# Patient Record
Sex: MALE | Race: ASIAN | NOT HISPANIC OR LATINO | Employment: FULL TIME | URBAN - METROPOLITAN AREA
[De-identification: names, ages, dates, MRNs, and addresses within clinical notes are randomized per-mention and may not be internally consistent; named-entity substitution may affect disease eponyms.]

---

## 2017-08-29 ENCOUNTER — OFFICE VISIT (OUTPATIENT)
Dept: INTERNAL MEDICINE | Facility: MEDICAL CENTER | Age: 62
End: 2017-08-29
Payer: COMMERCIAL

## 2017-08-29 VITALS
HEIGHT: 64 IN | BODY MASS INDEX: 21.21 KG/M2 | DIASTOLIC BLOOD PRESSURE: 100 MMHG | OXYGEN SATURATION: 96 % | SYSTOLIC BLOOD PRESSURE: 150 MMHG | WEIGHT: 124.2 LBS | TEMPERATURE: 97.5 F | HEART RATE: 98 BPM

## 2017-08-29 DIAGNOSIS — R35.89 POLYURIA: ICD-10-CM

## 2017-08-29 DIAGNOSIS — R63.4 WEIGHT LOSS: ICD-10-CM

## 2017-08-29 DIAGNOSIS — R35.0 URINARY FREQUENCY: ICD-10-CM

## 2017-08-29 DIAGNOSIS — R31.29 MICROSCOPIC HEMATURIA: ICD-10-CM

## 2017-08-29 DIAGNOSIS — Z23 NEED FOR IMMUNIZATION AGAINST INFLUENZA: ICD-10-CM

## 2017-08-29 DIAGNOSIS — R81 GLUCOSURIA: ICD-10-CM

## 2017-08-29 DIAGNOSIS — R25.2 CRAMPS OF LEFT LOWER EXTREMITY: ICD-10-CM

## 2017-08-29 DIAGNOSIS — Z00.00 HEALTHCARE MAINTENANCE: ICD-10-CM

## 2017-08-29 DIAGNOSIS — E11.9 TYPE 2 DIABETES MELLITUS WITHOUT COMPLICATION, WITHOUT LONG-TERM CURRENT USE OF INSULIN (HCC): ICD-10-CM

## 2017-08-29 DIAGNOSIS — Z86.11 HISTORY OF TB (TUBERCULOSIS): ICD-10-CM

## 2017-08-29 DIAGNOSIS — I10 ESSENTIAL HYPERTENSION: ICD-10-CM

## 2017-08-29 DIAGNOSIS — Z23 NEED FOR VACCINATION: ICD-10-CM

## 2017-08-29 LAB
APPEARANCE UR: CLEAR
BILIRUB UR STRIP-MCNC: ABNORMAL MG/DL
COLOR UR AUTO: YELLOW
GLUCOSE UR STRIP.AUTO-MCNC: 2000 MG/DL
HBA1C MFR BLD: 14.5 % (ref ?–5.8)
INT CON NEG: NEGATIVE
INT CON POS: POSITIVE
KETONES UR STRIP.AUTO-MCNC: 40 MG/DL
LEUKOCYTE ESTERASE UR QL STRIP.AUTO: ABNORMAL
NITRITE UR QL STRIP.AUTO: ABNORMAL
PH UR STRIP.AUTO: 5 [PH] (ref 5–8)
PROT UR QL STRIP: 100 MG/DL
RBC UR QL AUTO: ABNORMAL
SP GR UR STRIP.AUTO: 1.01
UROBILINOGEN UR STRIP-MCNC: ABNORMAL MG/DL

## 2017-08-29 PROCEDURE — 90686 IIV4 VACC NO PRSV 0.5 ML IM: CPT | Performed by: INTERNAL MEDICINE

## 2017-08-29 PROCEDURE — 81002 URINALYSIS NONAUTO W/O SCOPE: CPT | Performed by: INTERNAL MEDICINE

## 2017-08-29 PROCEDURE — 99205 OFFICE O/P NEW HI 60 MIN: CPT | Mod: GC,25 | Performed by: INTERNAL MEDICINE

## 2017-08-29 PROCEDURE — 83036 HEMOGLOBIN GLYCOSYLATED A1C: CPT | Performed by: INTERNAL MEDICINE

## 2017-08-29 PROCEDURE — 90471 IMMUNIZATION ADMIN: CPT | Performed by: INTERNAL MEDICINE

## 2017-08-29 RX ORDER — INSULIN GLARGINE 100 [IU]/ML
15 INJECTION, SOLUTION SUBCUTANEOUS EVERY EVENING
Qty: 100 ML | Refills: 6 | Status: SHIPPED | OUTPATIENT
Start: 2017-08-29 | End: 2018-03-05 | Stop reason: SDUPTHER

## 2017-08-29 RX ORDER — LISINOPRIL 20 MG/1
20 TABLET ORAL DAILY
Qty: 30 TAB | Refills: 3 | Status: SHIPPED | OUTPATIENT
Start: 2017-08-29 | End: 2018-01-09 | Stop reason: SDUPTHER

## 2017-08-29 RX ORDER — LANCETS 30 GAUGE
EACH MISCELLANEOUS
Qty: 300 EACH | Refills: 3 | Status: SHIPPED | OUTPATIENT
Start: 2017-08-29 | End: 2018-03-05 | Stop reason: SDUPTHER

## 2017-08-29 RX ORDER — ATORVASTATIN CALCIUM 20 MG/1
20 TABLET, FILM COATED ORAL DAILY
Qty: 90 TAB | Refills: 3 | Status: SHIPPED | OUTPATIENT
Start: 2017-08-29 | End: 2018-08-20 | Stop reason: SDUPTHER

## 2017-08-29 ASSESSMENT — ENCOUNTER SYMPTOMS
SPUTUM PRODUCTION: 0
BLOOD IN STOOL: 0
SORE THROAT: 0
HEMOPTYSIS: 0
VOMITING: 0
BLURRED VISION: 0
ABDOMINAL PAIN: 0
CLAUDICATION: 0
HEADACHES: 0
COUGH: 0
EYE PAIN: 0
PALPITATIONS: 0
PSYCHIATRIC NEGATIVE: 1
WEIGHT LOSS: 1
SHORTNESS OF BREATH: 0
NAUSEA: 0
EYE REDNESS: 0
NEUROLOGICAL NEGATIVE: 1
HEARTBURN: 0
FEVER: 0
CHILLS: 0

## 2017-08-29 ASSESSMENT — PATIENT HEALTH QUESTIONNAIRE - PHQ9: CLINICAL INTERPRETATION OF PHQ2 SCORE: 0

## 2017-08-29 NOTE — PATIENT INSTRUCTIONS
Record your blood pressure daily and bring back your BP log  Bring BG log to the appointment  Follow up in 2 wks with Dr. Yarelis Benito now.   Attend diabetic education class.    Diabetic Diet.  1800 Calorie Diet for Diabetes Meal Planning  The 1800 calorie diet is designed for eating up to 1800 calories each day. Following this diet and making healthy meal choices can help improve overall health. This diet controls blood sugar (glucose) levels and can also help lower blood pressure and cholesterol.  SERVING SIZES  Measuring foods and serving sizes helps to make sure you are getting the right amount of food. The list below tells how big or small some common serving sizes are:  · 1 oz.........4 stacked dice.   · 3 oz.........Deck of cards.   · 1 tsp........Tip of little finger.   · 1 tbs........Thumb.   · 2 tbs........Golf ball.   · ½ cup.......Half of a fist.   · 1 cup........A fist.   GUIDELINES FOR CHOOSING FOODS  The goal of this diet is to eat a variety of foods and limit calories to 1800 each day. This can be done by choosing foods that are low in calories and fat. The diet also suggests eating small amounts of food frequently. Doing this helps control your blood glucose levels so they do not get too high or too low. Each meal or snack may include a protein food source to help you feel more satisfied and to stabilize your blood glucose. Try to eat about the same amount of food around the same time each day. This includes weekend days, travel days, and days off work. Space your meals about 4 to 5 hours apart and add a snack between them if you wish.   For example, a daily food plan could include breakfast, a morning snack, lunch, dinner, and an evening snack. Healthy meals and snacks include whole grains, vegetables, fruits, lean meats, poultry, fish, and dairy products. As you plan your meals, select a variety of foods. Choose from the bread and starch, vegetable, fruit, dairy, and meat/protein groups. Examples  of foods from each group and their suggested serving sizes are listed below. Use measuring cups and spoons to become familiar with what a healthy portion looks like.  Bread and Starch  Each serving equals 15 grams of carbohydrates.  · 1 slice bread.   · ¼ bagel.   · ¾ cup cold cereal (unsweetened).   · ½ cup hot cereal or mashed potatoes.   · 1 small potato (size of a computer mouse).   ·  cup cooked pasta or rice.   · ½ English muffin.   · 1 cup broth-based soup.   · 3 cups of popcorn.   · 4 to 6 whole-wheat crackers.   · ½ cup cooked beans, peas, or corn.   Vegetable  Each serving equals 5 grams of carbohydrates.  · ½ cup cooked vegetables.   · 1 cup raw vegetables.   · ½ cup tomato or vegetable juice.   Fruit  Each serving equals 15 grams of carbohydrates.  · 1 small apple or orange.   · 1¼ cup watermelon or strawberries.   · ½ cup applesauce (no sugar added).   · 2 tbs raisins.   · ½ banana.   · ½ cup canned fruit, packed in water, its own juice, or sweetened with a sugar substitute.   · ½ cup unsweetened fruit juice.   Dairy  Each serving equals 12 to 15 grams of carbohydrates.  · 1 cup fat-free milk.   · 6 oz artificially sweetened yogurt or plain yogurt.   · 1 cup low-fat buttermilk.   · 1 cup soy milk.   · 1 cup almond milk.   Meat/Protein  · 1 large egg.   · 2 to 3 oz meat, poultry, or fish.   · ¼ cup low-fat cottage cheese.   · 1 tbs peanut butter.   · 1 oz low-fat cheese.   · ¼ cup tuna in water.   · ½ cup tofu.   Fat  · 1 tsp oil.   · 1 tsp trans-fat-free margarine.   · 1 tsp butter.   · 1 tsp mayonnaise.   · 2 tbs avocado.   · 1 tbs salad dressing.   · 1 tbs cream cheese.   · 2 tbs sour cream.   SAMPLE 1800 CALORIE DIET PLAN  Breakfast  · ¾ cup unsweetened cereal (1 carb serving).   · 1 cup fat-free milk (1 carb serving).   · 1 slice whole-wheat toast (1 carb serving).   · ½ small banana (1 carb serving).   · 1 scrambled egg.   · 1 tsp trans-fat-free margarine.   Lunch  · Tuna sandwich.   · 2 slices  whole-wheat bread (2 carb servings).   · ½ cup canned tuna in water, drained.   · 1 tbs reduced fat mayonnaise.   · 1 stalk celery, chopped.   · 2 slices tomato.   · 1 lettuce leaf.   · 1 cup carrot sticks.   · 24 to 30 seedless grapes (2 carb servings).   · 6 oz light yogurt (1 carb serving).   Afternoon Snack  · 3 vanessa cracker squares (1 carb serving).   · Fat-free milk, 1 cup (1 carb serving).   · 1 tbs peanut butter.   Dinner  · 3 oz salmon, broiled with 1 tsp oil.   · 1 cup mashed potatoes (2 carb servings) with 1 tsp trans-fat-free margarine.   · 1 cup fresh or frozen green beans.   · 1 cup steamed asparagus.   · 1 cup fat-free milk (1 carb serving).   Evening Snack  · 3 cups air-popped popcorn (1 carb serving).   · 2 tbs parmesan cheese sprinkled on top.   MEAL PLAN  Use this worksheet to help you make a daily meal plan based on the 1800 calorie diet suggestions. If you are using this plan to help you control your blood glucose, you may interchange carbohydrate-containing foods (dairy, starches, and fruits). Select a variety of fresh foods of varying colors and flavors. The total amount of carbohydrate in your meals or snacks is more important than making sure you include all of the food groups every time you eat. Choose from the following foods to build your day's meals:  · 8 Starches.   · 4 Vegetables.   · 3 Fruits.   · 2 Dairy.   · 6 to 7 oz Meat/Protein.   · Up to 4 Fats.   Your dietician can use this worksheet to help you decide how many servings and which types of foods are right for you.  BREAKFAST  Food Group and Servings / Food Choice  Starch ________________________________________________________  Dairy _________________________________________________________  Fruit _________________________________________________________  Meat/Protein __________________________________________________  Fat ___________________________________________________________  LUNCH  Food Group and Servings / Food  Choice  Starch ________________________________________________________  Meat/Protein __________________________________________________  Vegetable _____________________________________________________  Fruit _________________________________________________________  Dairy _________________________________________________________  Fat ___________________________________________________________  AFTERNOON SNACK  Food Group and Servings / Food Choice  Starch ________________________________________________________  Meat/Protein __________________________________________________  Fruit __________________________________________________________  Dairy _________________________________________________________  DINNER  Food Group and Servings / Food Choice  Starch _________________________________________________________  Meat/Protein ___________________________________________________  Dairy __________________________________________________________  Vegetable ______________________________________________________  Fruit ___________________________________________________________  Fat ____________________________________________________________  EVENING SNACK  Food Group and Servings / Food Choice  Fruit __________________________________________________________  Meat/Protein ___________________________________________________  Dairy __________________________________________________________  Starch _________________________________________________________  DAILY TOTALS  Starch ____________________________  Vegetable _________________________  Fruit _____________________________  Dairy _____________________________  Meat/Protein______________________  Fat _______________________________  Document Released: 07/10/2006 Document Revised: 03/11/2013 Document Reviewed: 11/02/2012  [employer]® Patient Information ©2013 [employer].

## 2017-08-29 NOTE — PROGRESS NOTES
Established Patient    Nabeel presents today with the following:    CC:  Susan.       HPI:     62 y/o M with a h/o Latent TB (completed full treatment), HTN presented here to establish.  He doesn't see PCP regularly and no other known medical history.  He stopped taking his antihypertensive medications about 1 yr ago.  Today, his /100.    As for his TB history.  He was diagnosed with latent TB after coming from [facility].  He was treated at [facility].  There was a concern kamryn completed the full treatment as patient reported that he didn't follow up with [facility].  Contacted [facility] and record obtained.  He completed the full treatment for TB and was cleared by [facility].  His full family was tested and all tested negative for TB at that time.   He didn't travel outside since that time.  He denies fever, chills, night sweats or cough.  He has has 30lb weight loss over 1 yr period and he attributes this to working hard.  On ROS, he has urinary frequency, polyuria and polydipsia.  In office UA showed glucose 2000, ketones 40, trace blood, and protein 100.  Negative for LE, and WBC.  Subsequent A1C showed 14.5.  Weight loss is likely due to DM.     HCM-  Never had colonoscopy              Patient Active Problem List    Diagnosis Date Noted   • Jaundice 01/26/2015       Current Outpatient Prescriptions   Medication Sig Dispense Refill   • lisinopril (PRINIVIL) 20 MG Tab Take 1 Tab by mouth every day. 30 Tab 3   • metformin (GLUCOPHAGE) 1000 MG tablet Take 1 Tab by mouth 2 times a day, with meals. 180 Tab 3   • insulin glargine (LANTUS) 100 UNIT/ML Solution Inject 15 Units as instructed every evening. 100 mL 6   • Blood Glucose Monitoring Suppl Supplies Misc Test strips order: Test strips for One Touch Ultra 2 meter. Sig: use  TID and prn ssx high or low sugar #100 RF x 3 300 Each 3   • Lancets Misc Lancets order: Lancets for One Touch Ultra 2 meter. Sig: use TID  "and prn ssx high or low sugar. #100 RF x 3 300 Each 3   • Blood Glucose Monitoring Suppl Device Meter: Dispense One Touch Ultra 2 meter. Sig. Use as directed for blood sugar monitoring. #1. NR. 1 Device 0   • atorvastatin (LIPITOR) 20 MG Tab Take 1 Tab by mouth every day. 90 Tab 3     No current facility-administered medications for this visit.        ROS: As per HPI. Additional pertinent symptoms as noted below.      Review of Systems   Constitutional: Positive for weight loss. Negative for chills and fever.   HENT: Negative for sore throat.    Eyes: Negative for blurred vision, pain and redness.   Respiratory: Negative for cough, hemoptysis, sputum production and shortness of breath.    Cardiovascular: Negative for chest pain, palpitations, claudication and leg swelling.   Gastrointestinal: Negative for abdominal pain, blood in stool, heartburn, melena, nausea and vomiting.   Genitourinary: Positive for frequency. Negative for dysuria, hematuria and urgency.        Denies weak stream or difficulty initiating urination   Musculoskeletal:        Muscle cramp   Skin: Negative for itching and rash.   Neurological: Negative.  Negative for headaches.   Endo/Heme/Allergies: Negative.    Psychiatric/Behavioral: Negative.        /100   Pulse 98   Temp 36.4 °C (97.5 °F)   Ht 1.632 m (5' 4.25\")   Wt 56.3 kg (124 lb 3.2 oz)   SpO2 96%   BMI 21.15 kg/m²     Physical Exam   Constitutional:  oriented to person, place, and time. Thin  Eyes: Pupils are equal, round.  No scleral icterus.  Neck: Neck supple. No thyromegaly present.   Cardiovascular: Normal rate, regular rhythm and normal heart sounds.  Exam reveals no gallop and no friction rub.  No murmur heard.  Pulmonary/Chest: Breath sounds normal. Chest wall is not dull to percussion.   Musculoskeletal:   no edema.   Abdomen:  Soft, BS+, Non tender, non distended  Lymphadenopathy: no cervical adenopathy  Neurological: alert and oriented to person, place, and time.   "   Skin: No cyanosis. Nails show no clubbing.        Assessment and Plan    1. Essential hypertension  - Will start Lisinopril 20mg   - Patient to monitor BP at home and bring BP log to next appointment  - Titrate BP medications   - lisinopril (PRINIVIL) 20 MG Tab; Take 1 Tab by mouth every day.  Dispense: 30 Tab; Refill: 3    2. Weight loss  - Likely related to undiagnosed diabetes  - No lymphadenopathy.   Stopped smoking 25 yrs ago.   - Discussed with routine cancer screening   - Patient states he will think about Colonoscopy.   - Given his new diagnoses of DM, new medications and referrals, he is quite overwhelmed at this time  - Will see him in 2 wks and discuss about colonoscopy again    3. History of TB (tuberculosis)  - Completed treatment.  Confirmed with [facility]  - No symptoms    4. Healthcare maintenance  - Will get routine labs   - CBC WITH DIFFERENTIAL; Future  - COMP METABOLIC PANEL; Future  - TSH WITH REFLEX TO FT4; Future  - LIPID PROFILE; Future    5. Urinary frequency      Polyuria  - No sign of BPH such as difficulty urinating and weak stream  - Urinary frequency/Polydipsia due to DM   - POCT Urinalysis    6. Glucosuria  - Due to Diabetes  - A1C 14.5  - POCT  A1C    7. Microscopic hematuria  - UA: Trace blood  - Will get PSA  - PROSTATE SPECIFIC AG    8. Need for immunization against influenza  - Flu shot given  - Flu Quad Inj >3 Year Pre-Filled (Preservative Free)    9. Type 2 diabetes mellitus without complication, without long-term current use of insulin (CMS-[facility])  - Diagnosed today.   Hasn't seen doctor for sometime  - Patient would benefit from Metformin as well as long acting insulin  - Patient declined to use Insulin due to concern that it may interfere with his work  - Reassured it is only once day dosing and it can be morning or evening   - Given his new diagnoses of DM, new medications and referrals, he is quite overwhelmed at this time  - Rx for Lantus 15 u qday is sent to his  pharmacy but will give him some time to digest this  - Patient continued to decline to use Insulin.   I informed him he needs Insulin given his A1C.   - Will see him in 2 wks and start lantus 15 u at that time.  Patient agreed  - Meantime, he will take Metformin 1000 BID  - Routine DM screening ordered and referral to DM educations as well as Ophthalmologist placed    - metformin (GLUCOPHAGE) 1000 MG tablet; Take 1 Tab by mouth 2 times a day, with meals.  Dispense: 180 Tab; Refill: 3  - insulin glargine (LANTUS) 100 UNIT/ML Solution; Inject 15 Units as instructed every evening.  Dispense: 100 mL; Refill: 6  - REFERRAL TO DIABETIC EDUCATION Diabetes Self Management Education / Training (DSME/T) and Medical Nutrition Therapy (MNT): Initial Group DSME/MNT as authorized by payor, Follow-Up DSME/MNT as authorized by payor; DSME/T Content: Monitoring Diabete...  - Blood Glucose Monitoring Suppl Supplies Misc; Test strips order: Test strips for One Touch Ultra 2 meter. Sig: use  TID and prn ssx high or low sugar #100 RF x 3  Dispense: 300 Each; Refill: 3  - Lancets Misc; Lancets order: Lancets for One Touch Ultra 2 meter. Sig: use TID and prn ssx high or low sugar. #100 RF x 3  Dispense: 300 Each; Refill: 3  - Blood Glucose Monitoring Suppl Device; Meter: Dispense One Touch Ultra 2 meter. Sig. Use as directed for blood sugar monitoring. #1. NR.  Dispense: 1 Device; Refill: 0  - MICROALBUMIN CREAT RATIO URINE; Future  - atorvastatin (LIPITOR) 20 MG Tab; Take 1 Tab by mouth every day.  Dispense: 90 Tab; Refill: 3  - REFERRAL TO OPHTHALMOLOGY    10. Leg Cramp  - Will check electrolytes  - Patient was advised warm compression for cramp  - CTM    Followup: Return in about 2 weeks (around 9/12/2017).      Signed by: Weston Valdivia M.D.

## 2017-08-29 NOTE — LETTER
[facility]  Weston Valdivia M.D.  [address]  [place] [address]  Fax: [phone]   Authorization for Release/Disclosure of   Protected Health Information   Name: TREY AIKEN JR. : 1955 SSN: [id number]   Address:  [facility] [facility] [address]  [place] [address] Phone:    [phone] (home)    I authorize the entity listed below to release/disclose the PHI below to:   [facility]/Weston Valdivia M.D. and Weston Valdivia M.D.   Provider or Entity Name:  [facility] Dept    Address   City, [place], [facility]   Phone:  [phone]    Fax:  [phone]   Reason for request: continuity of care   Information to be released: All TB related info-pt is here in our office    [  ] LAST COLONOSCOPY,  including any PATH REPORT and follow-up  [  ] LAST FIT/COLOGUARD RESULT [  ] LAST DEXA  [  ] LAST MAMMOGRAM  [  ] LAST PAP  [  ] LAST LABS [  ] RETINA EXAM REPORT  [  ] IMMUNIZATION RECORDS  [  ] Release all info      [  ] Check here and initial the line next to each item to release ALL health information INCLUDING  _____ Care and treatment for drug and / or alcohol abuse  _____ HIV testing, infection status, or AIDS  _____ Genetic Testing    DATES OF SERVICE OR TIME PERIOD TO BE DISCLOSED: _____________  I understand and acknowledge that:  * This Authorization may be revoked at any time by you in writing, except if your health information has already been used or disclosed.  * Your health information that will be used or disclosed as a result of you signing this authorization could be re-disclosed by the recipient. If this occurs, your re-disclosed health information may no longer be protected by State or Federal laws.  * You may refuse to sign this Authorization. Your refusal will not affect your ability to obtain treatment.  * This Authorization becomes effective upon signing and will  on (date) __________.      If no date is indicated, this Authorization will  one (1) year from the  signature date.    Name: Nabeel Lopez JrRoland    Signature:   Date:     8/29/2017       PLEASE FAX REQUESTED RECORDS BACK TO: [phone]  [facility]

## 2017-08-30 ENCOUNTER — TELEPHONE (OUTPATIENT)
Dept: INTERNAL MEDICINE | Facility: MEDICAL CENTER | Age: 62
End: 2017-08-30

## 2017-08-30 NOTE — TELEPHONE ENCOUNTER
1. Caller Name: Ana (daughter)                       Call Back Number: [phone]    2. Message: Will it be ok for Nabeel to have a dental extraction?     3. Patient approves office to leave a detailed voicemail/MyChart message: yes

## 2017-08-30 NOTE — TELEPHONE ENCOUNTER
Will await for lab result. If labs are unremarkable, he can proceed with dental extraction.   Called patient and left a voicemail.

## 2017-09-01 ENCOUNTER — TELEPHONE (OUTPATIENT)
Dept: INTERNAL MEDICINE | Facility: MEDICAL CENTER | Age: 62
End: 2017-09-01

## 2017-09-01 NOTE — TELEPHONE ENCOUNTER
1. Caller Name: Ana (daughter)                       Call Back Number: [phone]    2. Message: 1. One of the new rx's is causing insomnia.     2. Is Advil ok to take for HA's?     3. Patient approves office to leave a detailed voicemail/MyChart message: yes

## 2017-09-06 LAB
ALBUMIN SERPL-MCNC: 3.7 G/DL (ref 3.6–4.8)
ALBUMIN/CREAT UR: 106.4 MG/G CREAT (ref 0–30)
ALBUMIN/GLOB SERPL: 0.9 {RATIO} (ref 1.2–2.2)
ALP SERPL-CCNC: 224 IU/L (ref 39–117)
ALT SERPL-CCNC: 28 IU/L (ref 0–44)
AST SERPL-CCNC: 27 IU/L (ref 0–40)
BASOPHILS # BLD AUTO: 0 X10E3/UL (ref 0–0.2)
BASOPHILS NFR BLD AUTO: 0 %
BILIRUB SERPL-MCNC: 1.3 MG/DL (ref 0–1.2)
BUN SERPL-MCNC: 15 MG/DL (ref 8–27)
BUN/CREAT SERPL: 18 (ref 10–24)
CALCIUM SERPL-MCNC: 9.3 MG/DL (ref 8.6–10.2)
CHLORIDE SERPL-SCNC: 95 MMOL/L (ref 96–106)
CHOLEST SERPL-MCNC: 115 MG/DL (ref 100–199)
CO2 SERPL-SCNC: 24 MMOL/L (ref 18–29)
COMMENT 011824: NORMAL
CREAT SERPL-MCNC: 0.85 MG/DL (ref 0.76–1.27)
CREAT UR-MCNC: 65.8 MG/DL
EOSINOPHIL # BLD AUTO: 0.1 X10E3/UL (ref 0–0.4)
EOSINOPHIL NFR BLD AUTO: 1 %
ERYTHROCYTE [DISTWIDTH] IN BLOOD BY AUTOMATED COUNT: 13.4 % (ref 12.3–15.4)
GLOBULIN SER CALC-MCNC: 4 G/DL (ref 1.5–4.5)
GLUCOSE SERPL-MCNC: 270 MG/DL (ref 65–99)
HCT VFR BLD AUTO: 47.8 % (ref 37.5–51)
HDLC SERPL-MCNC: 75 MG/DL
HGB BLD-MCNC: 16.4 G/DL (ref 12.6–17.7)
IMM GRANULOCYTES # BLD: 0 X10E3/UL (ref 0–0.1)
IMM GRANULOCYTES NFR BLD: 0 %
IMMATURE CELLS  115398: ABNORMAL
LDLC SERPL CALC-MCNC: 24 MG/DL (ref 0–99)
LYMPHOCYTES # BLD AUTO: 3.2 X10E3/UL (ref 0.7–3.1)
LYMPHOCYTES NFR BLD AUTO: 41 %
MCH RBC QN AUTO: 33.2 PG (ref 26.6–33)
MCHC RBC AUTO-ENTMCNC: 34.3 G/DL (ref 31.5–35.7)
MCV RBC AUTO: 97 FL (ref 79–97)
MICROALBUMIN UR-MCNC: 70 UG/ML
MONOCYTES # BLD AUTO: 0.5 X10E3/UL (ref 0.1–0.9)
MONOCYTES NFR BLD AUTO: 6 %
MORPHOLOGY BLD-IMP: ABNORMAL
NEUTROPHILS # BLD AUTO: 4 X10E3/UL (ref 1.4–7)
NEUTROPHILS NFR BLD AUTO: 52 %
NRBC BLD AUTO-RTO: ABNORMAL %
PLATELET # BLD AUTO: 203 X10E3/UL (ref 150–379)
POTASSIUM SERPL-SCNC: 4.6 MMOL/L (ref 3.5–5.2)
PROT SERPL-MCNC: 7.7 G/DL (ref 6–8.5)
RBC # BLD AUTO: 4.94 X10E6/UL (ref 4.14–5.8)
SODIUM SERPL-SCNC: 135 MMOL/L (ref 134–144)
TRIGL SERPL-MCNC: 78 MG/DL (ref 0–149)
TSH SERPL DL<=0.005 MIU/L-ACNC: 1.28 UIU/ML (ref 0.45–4.5)
VLDLC SERPL CALC-MCNC: 16 MG/DL (ref 5–40)
WBC # BLD AUTO: 7.8 X10E3/UL (ref 3.4–10.8)

## 2017-09-06 NOTE — TELEPHONE ENCOUNTER
He may take advil for headache.  If he still has insomnia, please ask patient to come to office and get evaluated.  MA please call patient.

## 2017-09-14 ENCOUNTER — OFFICE VISIT (OUTPATIENT)
Dept: INTERNAL MEDICINE | Facility: MEDICAL CENTER | Age: 62
End: 2017-09-14
Payer: COMMERCIAL

## 2017-09-14 VITALS
HEIGHT: 65 IN | OXYGEN SATURATION: 96 % | TEMPERATURE: 97.1 F | SYSTOLIC BLOOD PRESSURE: 106 MMHG | WEIGHT: 127.25 LBS | DIASTOLIC BLOOD PRESSURE: 80 MMHG | HEART RATE: 90 BPM | BODY MASS INDEX: 21.2 KG/M2

## 2017-09-14 DIAGNOSIS — R74.8 ELEVATED ALKALINE PHOSPHATASE LEVEL: ICD-10-CM

## 2017-09-14 DIAGNOSIS — E11.9 TYPE 2 DIABETES MELLITUS WITHOUT COMPLICATION, WITHOUT LONG-TERM CURRENT USE OF INSULIN (HCC): ICD-10-CM

## 2017-09-14 DIAGNOSIS — I10 ESSENTIAL HYPERTENSION: ICD-10-CM

## 2017-09-14 DIAGNOSIS — R35.0 URINARY FREQUENCY: ICD-10-CM

## 2017-09-14 DIAGNOSIS — H26.9 CATARACT OF BOTH EYES, UNSPECIFIED CATARACT TYPE: ICD-10-CM

## 2017-09-14 PROCEDURE — 99214 OFFICE O/P EST MOD 30 MIN: CPT | Mod: GC | Performed by: INTERNAL MEDICINE

## 2017-09-15 NOTE — PROGRESS NOTES
Established Patient    Nabeel presents today with the following:    CC: 2-week follow up    HPI: Mr. Lopez is a 61 year old gentleman with PMHx significant for HTN and DM2 with polyuria who presents for 2-week follow up after starting lisinopril and metformin and after obtaining labs.  Today, patient doing well overall except for increased blurry vision over past 2 months.  Reviewed lab results with patient, which showed elevated alkaline phosphatase.      HTN: Patient taking lisinopril with no problems.  Has kept a BP log, which has shown SBPs from 100s-120s and DBPs from 70s-80s.  Denies dizziness, lightheadedness, nausea, cough.      DM2, polyuria: Patient taking metformin, but unable to check blood sugar because he could not afford the blood glucose monitor and strips.  Denies tremors/shakes, diaphoresis.  Patient states urinary frequency has improved, going from 6x at night to twice a night.     Blurry vision: Patient reports worsening blurry vision x2 months.  States he had an Ophthalmology appointment today (Dr. Busby) during which he was told he has cataracts and needs cataracts surgery.  Patient states he needs time to consider this.      Elevated alkaline phosphatase: Patient denies abdominal pain, pruritus, myalgias/bone pain.        Patient Active Problem List    Diagnosis Date Noted   • Essential hypertension 08/29/2017   • Weight loss 08/29/2017   • History of TB (tuberculosis) 08/29/2017   • Urinary frequency 08/29/2017   • Glucosuria 08/29/2017   • Polyuria 08/29/2017   • Microscopic hematuria 08/29/2017   • Need for immunization against influenza 08/29/2017   • Type 2 diabetes mellitus without complication, without long-term current use of insulin (CMS-[facility]) 08/29/2017       Current Outpatient Prescriptions   Medication Sig Dispense Refill   • lisinopril (PRINIVIL) 20 MG Tab Take 1 Tab by mouth every day. 30 Tab 3   • metformin (GLUCOPHAGE) 1000 MG tablet Take 1 Tab by mouth 2 times a day, with  "meals. 180 Tab 3   • atorvastatin (LIPITOR) 20 MG Tab Take 1 Tab by mouth every day. 90 Tab 3   • insulin glargine (LANTUS) 100 UNIT/ML Solution Inject 15 Units as instructed every evening. 100 mL 6   • Blood Glucose Monitoring Suppl Supplies Misc Test strips order: Test strips for One Touch Ultra 2 meter. Sig: use  TID and prn ssx high or low sugar #100 RF x 3 300 Each 3   • Lancets Misc Lancets order: Lancets for One Touch Ultra 2 meter. Sig: use TID and prn ssx high or low sugar. #100 RF x 3 300 Each 3   • Blood Glucose Monitoring Suppl Device Meter: Dispense One Touch Ultra 2 meter. Sig. Use as directed for blood sugar monitoring. #1. NR. 1 Device 0     No current facility-administered medications for this visit.        ROS: 12 point review of systems negative except as reported in HPI and below:     Eyes:  Positive worsening vision x2 months secondary to cataracts.    :  Positive for improving polyuria.        /80   Pulse 90   Temp 36.2 °C (97.1 °F)   Ht 1.638 m (5' 4.5\")   Wt 57.7 kg (127 lb 4 oz)   SpO2 96%   BMI 21.51 kg/m²     Physical Exam   Constitutional:  Sitting in chair in exam room.  Oriented to person, place, time, and situation.  In no apparent distress.    Eyes: Bilateral cataracts present.    Neck: Neck supple. No thyromegaly present.   Cardiovascular: Normal rate, regular rhythm and normal heart sounds.  Exam reveals no gallop and no friction rub.  No murmur heard.  Pulmonary/Chest: Breath sounds normal. Chest wall is not dull to percussion.   Musculoskeletal:   no edema.   Lymphadenopathy: no cervical adenopathy  Skin: No cyanosis.  No rash.      Monofilament test: negative.      Note: I have reviewed all pertinent labs and diagnostic tests associated with this visit with specific comments listed under the assessment and plan below.       Assessment and Plan    1. Essential hypertension  - Patient started on lisinopril 20 mg daily two weeks ago.  No reported side effects/problems. "    - Patient's home BP log: SBPs from 100s-120s and DBPs from 70s-80s.    - Continue lisinopril.  Will continue to monitor.     2. Type 2 diabetes mellitus without complication, without long-term current use of insulin (CMS-[facility])  3. Polyuria  - Patient taking metformin 1000 mg twice daily with meals.  No reported side effects/problems.  Urinary frequency has significantly improved.    - Has not been checking blood sugar because he could not afford the blood glucose monitor and strips.  Patient given print out of [employer] (his health insurance provider)-preferred blood glucose monitor and strips, which are 100% covered.  He will show this to his daughter, who assists with his healthcare, and they will order a monitor and strips.    - Monofilament test negative.   - Patient to start taking Lantus 15 U daily.  Was given a demonstration of how to use insulin pen.  Was also provided a handout on how to use insulin pen.    - Had appointment with Ophthalmology today (see problem #5).      4. Elevated alkaline phosphatase level  - 9/5/17 Alk phos 224.  Total bili 1.3.  AST/ALT unremarkable.    - Alk phos has been elevated to 110-140s prior to this.    - Patient denies abdominal pain, pruritus, bone pain, myalgias.  - No jaundice, scleral icterus.    - Will check GGT.      5. Cataract of both eyes, unspecified cataract type  - Patient reports worsening blurry vision x2 months.  Had Ophthalmology appointment today (Dr. Busby).  Patient reports cataracts surgery was recommended.    - Patient to follow up with Ophthalmology.      6. Health maintenance  - Patient has never had a colonoscopy.  Will reassess his willingness to undergo one on follow up visit.    - Flu vaccine: Patient states he will obtain vaccine at the pharmacy.        Followup: Return in about 3 months (around 12/14/2017).    Signed by: Aydee Santoyo M.D.

## 2017-09-19 DIAGNOSIS — E11.9 TYPE 2 DIABETES MELLITUS WITHOUT COMPLICATION, WITHOUT LONG-TERM CURRENT USE OF INSULIN (HCC): ICD-10-CM

## 2017-09-20 LAB — GGT SERPL-CCNC: 583 IU/L (ref 0–65)

## 2017-12-28 ENCOUNTER — OFFICE VISIT (OUTPATIENT)
Dept: INTERNAL MEDICINE | Facility: MEDICAL CENTER | Age: 62
End: 2017-12-28
Payer: COMMERCIAL

## 2017-12-28 VITALS
OXYGEN SATURATION: 94 % | SYSTOLIC BLOOD PRESSURE: 141 MMHG | BODY MASS INDEX: 24.12 KG/M2 | DIASTOLIC BLOOD PRESSURE: 90 MMHG | HEART RATE: 74 BPM | TEMPERATURE: 97.5 F | HEIGHT: 65 IN | WEIGHT: 144.8 LBS

## 2017-12-28 DIAGNOSIS — I10 ESSENTIAL HYPERTENSION: ICD-10-CM

## 2017-12-28 DIAGNOSIS — E11.9 TYPE 2 DIABETES MELLITUS WITHOUT COMPLICATION, WITHOUT LONG-TERM CURRENT USE OF INSULIN (HCC): ICD-10-CM

## 2017-12-28 DIAGNOSIS — R74.8 ELEVATED SERUM GGT LEVEL: ICD-10-CM

## 2017-12-28 DIAGNOSIS — R74.8 ELEVATED SERUM ALKALINE PHOSPHATASE LEVEL: ICD-10-CM

## 2017-12-28 DIAGNOSIS — Z00.00 HEALTHCARE MAINTENANCE: ICD-10-CM

## 2017-12-28 LAB — GLUCOSE BLD-MCNC: 196 MG/DL (ref 70–100)

## 2017-12-28 PROCEDURE — 99214 OFFICE O/P EST MOD 30 MIN: CPT | Mod: GC | Performed by: INTERNAL MEDICINE

## 2017-12-28 PROCEDURE — 82962 GLUCOSE BLOOD TEST: CPT | Performed by: INTERNAL MEDICINE

## 2017-12-29 NOTE — PROGRESS NOTES
Established Patient    Nabeel presents today with the following:    CC: 3 month follow up.      HPI: Mr. Lopez is a 62 year old gentleman with PMHx significant for HTN, DM2, cataracts, and elevated alkaline phosphatase who presents for 3 month follow up.       HTN: Patient continuing to take lisinopril without problems.  Has kept a BP log at home, which has shown SBPs 120s-140s and DBPs 80s-90s.  Denies dizziness, lightheadedness, nausea/vomiting.      DM2: Patient taking metformin, but unable to check blood sugar or use the insulin pen he was prescribed during last visit because of poor blurry vision.  Patient states his vision is so poor he cannot read the numbers on the pen or blood glucose monitor.  His wife is the only person who lives with him and her vision is also poor and she cannot help him.  Denies polyuria, polydipsia, numbness/tingling, hematuria, abdominal pain.      Blurry vision: Patient's vision continues to be significantly poor/blurry.  He states he is now connected with [employer], who will financially be assisting him with cataracts surgery.  His appointment with them is in mid-January.      Elevated alkaline phosphatase and elevated GGT: Patient denies abdominal pain, nausea/vomiting, pruritus.          Patient Active Problem List    Diagnosis Date Noted   • Essential hypertension 08/29/2017   • Weight loss 08/29/2017   • History of TB (tuberculosis) 08/29/2017   • Urinary frequency 08/29/2017   • Glucosuria 08/29/2017   • Polyuria 08/29/2017   • Microscopic hematuria 08/29/2017   • Need for immunization against influenza 08/29/2017   • Type 2 diabetes mellitus without complication, without long-term current use of insulin (CMS-[facility]) 08/29/2017       Current Outpatient Prescriptions   Medication Sig Dispense Refill   • Blood Glucose Monitoring Suppl Device Meter: Dispense One Touch Ultra 2 system . Sig. Use to check blood sugars 3 times daily DX:E11.9 1 Device 0   • lisinopril  "(PRINIVIL) 20 MG Tab Take 1 Tab by mouth every day. 30 Tab 3   • metformin (GLUCOPHAGE) 1000 MG tablet Take 1 Tab by mouth 2 times a day, with meals. 180 Tab 3   • insulin glargine (LANTUS) 100 UNIT/ML Solution Inject 15 Units as instructed every evening. 100 mL 6   • Blood Glucose Monitoring Suppl Supplies Misc Test strips order: Test strips for One Touch Ultra 2 meter. Sig: use  TID and prn ssx high or low sugar #100 RF x 3 300 Each 3   • Lancets Misc Lancets order: Lancets for One Touch Ultra 2 meter. Sig: use TID and prn ssx high or low sugar. #100 RF x 3 300 Each 3   • atorvastatin (LIPITOR) 20 MG Tab Take 1 Tab by mouth every day. 90 Tab 3     No current facility-administered medications for this visit.        ROS: 12 point review of systems negative except as reported in HPI and below:    Eyes:  Positive for worsening, bilateral blurry vision secondary to cataracts.        /90   Pulse 74   Temp 36.4 °C (97.5 °F)   Ht 1.638 m (5' 4.5\")   Wt 65.7 kg (144 lb 12.8 oz)   SpO2 94%   BMI 24.47 kg/m²     Physical Exam   Constitutional:  oriented to person, place, and time. No distress.   Eyes: Bilateral cataracts present.  No scleral icterus.  Neck: Neck supple. No thyromegaly present.   Cardiovascular: Normal rate, regular rhythm and normal heart sounds.  Exam reveals no gallop and no friction rub.  No murmur heard.  Pulmonary/Chest: Breath sounds normal. Chest wall is not dull to percussion.   Musculoskeletal:   no edema.   Lymphadenopathy: no cervical adenopathy  Neurological: alert and oriented to person, place, and time.   Skin: No cyanosis. Nails show no clubbing.    Note: I have reviewed all pertinent labs and diagnostic tests associated with this visit with specific comments listed under the assessment and plan below    Assessment and Plan    1. Type 2 diabetes mellitus without complication, without long-term current use of insulin (CMS-HCC)  - 08/2017 HgbA1c 14.5%.    - Patient taking metformin, " but unable to check blood sugar or use the insulin pen he was prescribed during last visit because of poor blurry vision.  No one in household able to assist.    - Denies polyuria, polydipsia, numbness/tingling, hematuria, abdominal pain.    - Sent urgent referral to Endocrinology for assistance in managing patient's diabetes in setting of inability to use insulin pen given poor vision.  - Continue metformin until then.  - Will check with patient/Endocrinology that appointment is made soon.     2. Blurry vision secondary to bilateral cataracts  - Patient's vision continues to be significantly poor/blurry.    - Connected with [facility], who will financially be assisting him with cataracts surgery.  His appointment with them is in mid-January.      3. Elevated serum alkaline phosphatase level  4. Elevated serum GGT level  - Alk phos 224.  .   - Despite lack of abdominal pain, nausea/vomiting, pruritus, icterus, jaundice; patient's elevated alk phos with significantly elevated GGT warrants further investigation.   - Will obtain CT abdomen/pelvis to further investigate possible liver/biliary system/pancreas pathology.     5. Essential hypertension  - Home SBPs 120s-140s and DBPs 80s-90s.  No dizziness, lightheadedness, nausea/vomiting.    - BP today 141/90.   - Continue lisinopril 20 mg daily.      6. Healthcare maintenance  - Obtained flu shot this year.  - Colonoscopy: has never had a colonoscopy.  Has agreed to referral for colonoscopy.        Followup: Return in about 2 months (around 2/28/2018).    Signed by: Aydee Santoyo M.D.

## 2018-01-02 ENCOUNTER — TELEPHONE (OUTPATIENT)
Dept: INTERNAL MEDICINE | Facility: MEDICAL CENTER | Age: 63
End: 2018-01-02

## 2018-01-02 DIAGNOSIS — R74.8 ELEVATED ALKALINE PHOSPHATASE LEVEL: ICD-10-CM

## 2018-01-02 NOTE — TELEPHONE ENCOUNTER
1. Caller Name: Benita                       Call Back Number:     2. Message: patient scheduled for a CT scan with contrast, need order for BUN/Creatinine     3. Patient approves office to leave a detailed voicemail/MyChart message: N\A

## 2018-01-05 NOTE — TELEPHONE ENCOUNTER
Lab ordered placed for BMP.  Patient to obtain BMP at least two days before CT appointment.  Office to call patient to notify him of this.

## 2018-01-09 DIAGNOSIS — I10 ESSENTIAL HYPERTENSION: ICD-10-CM

## 2018-01-09 NOTE — TELEPHONE ENCOUNTER
Last seen: 12/28/17 by Dr. Santoyo  Next appt: 03/05/18 with Dr. Santoyo    Was the patient seen in the last year in this department? Yes   Does patient have an active prescription for medications requested? No   Received Request Via: Pharmacy

## 2018-01-17 RX ORDER — LISINOPRIL 20 MG/1
20 TABLET ORAL DAILY
Qty: 90 TAB | Refills: 2 | Status: SHIPPED | OUTPATIENT
Start: 2018-01-17 | End: 2021-03-15 | Stop reason: SDUPTHER

## 2018-01-18 ENCOUNTER — HOSPITAL ENCOUNTER (OUTPATIENT)
Dept: LAB | Facility: MEDICAL CENTER | Age: 63
End: 2018-01-18
Attending: STUDENT IN AN ORGANIZED HEALTH CARE EDUCATION/TRAINING PROGRAM
Payer: COMMERCIAL

## 2018-01-18 DIAGNOSIS — R74.8 ELEVATED ALKALINE PHOSPHATASE LEVEL: ICD-10-CM

## 2018-01-18 LAB
ANION GAP SERPL CALC-SCNC: 6 MMOL/L (ref 0–11.9)
BUN SERPL-MCNC: 14 MG/DL (ref 8–22)
CALCIUM SERPL-MCNC: 9.1 MG/DL (ref 8.5–10.5)
CHLORIDE SERPL-SCNC: 102 MMOL/L (ref 96–112)
CO2 SERPL-SCNC: 27 MMOL/L (ref 20–33)
CREAT SERPL-MCNC: 1.02 MG/DL (ref 0.5–1.4)
GGT SERPL-CCNC: 409 U/L (ref 7–51)
GLUCOSE SERPL-MCNC: 156 MG/DL (ref 65–99)
POTASSIUM SERPL-SCNC: 4.1 MMOL/L (ref 3.6–5.5)
SODIUM SERPL-SCNC: 135 MMOL/L (ref 135–145)

## 2018-01-18 PROCEDURE — 80048 BASIC METABOLIC PNL TOTAL CA: CPT

## 2018-01-18 PROCEDURE — 36415 COLL VENOUS BLD VENIPUNCTURE: CPT

## 2018-01-18 PROCEDURE — 82977 ASSAY OF GGT: CPT

## 2018-01-19 ENCOUNTER — HOSPITAL ENCOUNTER (OUTPATIENT)
Dept: RADIOLOGY | Facility: MEDICAL CENTER | Age: 63
End: 2018-01-19
Attending: STUDENT IN AN ORGANIZED HEALTH CARE EDUCATION/TRAINING PROGRAM
Payer: COMMERCIAL

## 2018-01-19 DIAGNOSIS — R74.8 ELEVATED SERUM ALKALINE PHOSPHATASE LEVEL: ICD-10-CM

## 2018-01-19 DIAGNOSIS — R74.8 ELEVATED SERUM GGT LEVEL: ICD-10-CM

## 2018-01-19 PROCEDURE — 74177 CT ABD & PELVIS W/CONTRAST: CPT

## 2018-03-05 ENCOUNTER — OFFICE VISIT (OUTPATIENT)
Dept: INTERNAL MEDICINE | Facility: MEDICAL CENTER | Age: 63
End: 2018-03-05
Payer: COMMERCIAL

## 2018-03-05 VITALS
DIASTOLIC BLOOD PRESSURE: 86 MMHG | OXYGEN SATURATION: 97 % | SYSTOLIC BLOOD PRESSURE: 140 MMHG | BODY MASS INDEX: 25.16 KG/M2 | HEART RATE: 92 BPM | HEIGHT: 65 IN | WEIGHT: 151 LBS | TEMPERATURE: 97.3 F

## 2018-03-05 DIAGNOSIS — K74.60 CIRRHOSIS OF LIVER WITHOUT ASCITES, UNSPECIFIED HEPATIC CIRRHOSIS TYPE (HCC): ICD-10-CM

## 2018-03-05 DIAGNOSIS — E11.9 TYPE 2 DIABETES MELLITUS WITHOUT COMPLICATION, WITHOUT LONG-TERM CURRENT USE OF INSULIN (HCC): ICD-10-CM

## 2018-03-05 PROCEDURE — 99214 OFFICE O/P EST MOD 30 MIN: CPT | Mod: GC | Performed by: INTERNAL MEDICINE

## 2018-03-05 RX ORDER — OFLOXACIN 3 MG/ML
SOLUTION/ DROPS OPHTHALMIC
COMMUNITY
Start: 2018-02-21 | End: 2020-12-15

## 2018-03-05 RX ORDER — PREDNISOLONE ACETATE 10 MG/ML
SUSPENSION/ DROPS OPHTHALMIC
COMMUNITY
Start: 2018-02-21 | End: 2020-12-15

## 2018-03-05 RX ORDER — LOTEPREDNOL ETABONATE 5 MG/G
GEL OPHTHALMIC
COMMUNITY
Start: 2018-02-16 | End: 2020-12-15

## 2018-03-05 RX ORDER — INSULIN GLARGINE 100 [IU]/ML
15 INJECTION, SOLUTION SUBCUTANEOUS EVERY EVENING
Qty: 100 ML | Refills: 6 | Status: SHIPPED | OUTPATIENT
Start: 2018-03-05 | End: 2018-03-13

## 2018-03-05 RX ORDER — LANCETS 30 GAUGE
EACH MISCELLANEOUS
Qty: 300 EACH | Refills: 3 | Status: SHIPPED | OUTPATIENT
Start: 2018-03-05 | End: 2022-11-14

## 2018-03-05 NOTE — PROGRESS NOTES
Established Patient    Nabeel presents today with the following:    CC: Two-month follow-up for diabetes and elevated alkaline phosphatase.    HPI: Mr. Lopez is 62-year-old gentleman with past medical history significant for hypertension, diabetes type II, cataracts, elevated alkaline phosphatase with elevated GGT who presents for two-month follow-up.    DM2: Patient is taking his metformin, but has remained unable to check his blood sugar or give himself insulin injections due to his poor blurry vision. He states he ran out of his blood glucose strips because other members of his family are using it. He also states that he cannot locate his insulin pen. However, he did have corrective cataract surgery one week ago and reports 20/20 vision in his right eye, which is allowing him to read better. He will be able to use his insulin pen now if he is prescribed a new one.    Elevated alkaline phosphatase and elevated GGT: Patient had a CT of his abdomen/pelvis, which showed heterogenous nodular liver suggestive of cirrhosis. It also showed borderline splenomegaly, mesenteric edema, a tiny cyst or angiomyolipoma in the left kidney, scattered colon diverticuli, small gallbladder wall thickening, and a 4.4 mm left lower lobe nodule. Patient continues to deny abdominal pain, nausea/vomiting, pruritus.  On further questioning, he states that several members of his family  from cirrhosis, including his father and three uncles.      Patient Active Problem List    Diagnosis Date Noted   • Essential hypertension 2017   • Weight loss 2017   • History of TB (tuberculosis) 2017   • Urinary frequency 2017   • Glucosuria 2017   • Polyuria 2017   • Microscopic hematuria 2017   • Need for immunization against influenza 2017   • Type 2 diabetes mellitus without complication, without long-term current use of insulin (CMS-[facility]) 2017       Current Outpatient Prescriptions  "  Medication Sig Dispense Refill   • insulin glargine (LANTUS) 100 UNIT/ML Solution Inject 15 Units as instructed every evening. 100 mL 6   • Blood Glucose Monitoring Suppl Supplies Misc Test strips order: Test strips for One Touch Ultra 2 meter. Sig: use  TID and prn ssx high or low sugar #100 RF x 3 300 Each 3   • Lancets Misc Lancets order: Lancets for One Touch Ultra 2 meter. Sig: use TID and prn ssx high or low sugar. #100 RF x 3 300 Each 3   • lisinopril (PRINIVIL) 20 MG Tab TAKE 1 TAB BY MOUTH EVERY DAY. 90 Tab 2   • metformin (GLUCOPHAGE) 1000 MG tablet Take 1 Tab by mouth 2 times a day, with meals. 180 Tab 3   • atorvastatin (LIPITOR) 20 MG Tab Take 1 Tab by mouth every day. 90 Tab 3   • LOTEMAX 0.5 % Gel      • ofloxacin (OCUFLOX) 0.3 % Solution      • prednisoLONE acetate (PRED FORTE) 1 % Suspension      • Blood Glucose Monitoring Suppl Device Meter: Dispense One Touch Ultra 2 system . Sig. Use to check blood sugars 3 times daily DX:E11.9 1 Device 0     No current facility-administered medications for this visit.        ROS: 12 point review of systems negative except as in HPI and below.    Eyes: Positive for blurry vision in the left eye, 20/20 vision in the right eye.      /86   Pulse 92   Temp 36.3 °C (97.3 °F)   Ht 1.638 m (5' 4.5\")   Wt 68.5 kg (151 lb)   SpO2 97%   BMI 25.52 kg/m²     Physical Exam   Constitutional:  Sitting in exam room chair, oriented to person, place, and time. No distress.   Eyes: Pupils are equal, round, and reactive to light. No scleral icterus.  Neck: Neck supple. No thyromegaly present.   Cardiovascular: Normal rate, regular rhythm and normal heart sounds.  Exam reveals no gallop and no friction rub.  No murmur heard.  Pulmonary/Chest: Breath sounds normal. Chest wall is not dull to percussion. No gynecomastia.  Abdominal: Mild distention, unchanged from past to office visits. No fluid wave, no dullness to percussion. No tenderness to palpation, no " "rebound/guarding. No caput medusae.    Musculoskeletal:   no edema.   Lymphadenopathy: no cervical adenopathy  Neurological: alert and oriented to person, place, and time.   Skin: No cyanosis. Nails show no clubbing. No jaundice. No spider angioma.    Note: I have reviewed all pertinent labs and diagnostic tests associated with this visit with specific comments listed under the assessment and plan below    Assessment and Plan    1. Type 2 diabetes mellitus without complication, without long-term current use of insulin (CMS-[facility])  - Patient is taking his metformin, but has remained unable to check his blood sugar or give himself insulin injections due to his poor blurry vision.  He ran out of his blood glucose strips because other members of his family are using it. He also could not locate his insulin pen. However, he did have corrective cataract surgery one week ago and reports 20/20 vision in his right eye, which is allowing him to read better. He will now be able to use his insulin pen.   - Insulin, blood glucose strips and lancets reordered.   - Will follow up in 3 months and recheck hemoglobin A1c.    2. Cirrhosis of liver without ascites, unspecified hepatic cirrhosis type (CMS-HCC)  - Patient with CT abdomen/pelvis that showed heterogenous nodular liver suggestive of cirrhosis.   - Continues to deny abdominal pain, nausea/vomiting, pruritus.  Has a family history of several members of his family dying from cirrhosis, including his father and three uncles.    - Possibility of etiology of patient's cirrhosis includes hepatitis, alcohol, autoimmune hepatitis, inherited liver disease (hemochromatosis).  Hepatitis panel 01/2015 negative.  JACIEL 01/2015 negative.  - Patient does have a history of alcohol use, though it is unclear how much alcohol he has used in the past (\"three beers to go to sleep after work,\" \"drink at family parties\").  Presently drinks 1-2 beers a month.  Counseled patient on cessation of all " alcohol use.  Patient agrees.  - Given patient's extensive family history of cirrhosis, will workup for possible hemochromatosis, including ferritin level. Will still have elevated suspicion for hemochromatosis even if ferritin level low, so will also order genetic testing for HRE.    - Follow-up AFP, iron panel.      Followup: Return in about 3 months (around 6/5/2018).    Signed by: Aydee Santoyo M.D.

## 2018-03-08 ENCOUNTER — TELEPHONE (OUTPATIENT)
Dept: INTERNAL MEDICINE | Facility: MEDICAL CENTER | Age: 63
End: 2018-03-08

## 2018-03-08 NOTE — TELEPHONE ENCOUNTER
1. Caller Name: [facility] pharmacy                      Call Back Number: [phone]    2. Message: received fax from patient's pharmacy that Lantus is not covered by patient's insurance.   Preferred medications are: Basaglar, Levemir, Tresiba.       3. Patient approves office to leave a detailed voicemail/MyChart message: N\A

## 2018-03-13 NOTE — TELEPHONE ENCOUNTER
Aydee Santoyo M.D.   [facility] 24 minutes ago (2:17 PM)      Will change to Basaglar/generic, whichever best for patient's insurance.  Prescription sent to patient's pharmacy.  Thank you.  (Routing comment)

## 2018-08-20 DIAGNOSIS — E11.9 TYPE 2 DIABETES MELLITUS WITHOUT COMPLICATION, WITHOUT LONG-TERM CURRENT USE OF INSULIN (HCC): ICD-10-CM

## 2018-08-20 NOTE — TELEPHONE ENCOUNTER
Last seen: 03/05/18 by Dr. Santoyo  Next appt: None     Was the patient seen in the last year in this department? Yes   Does patient have an active prescription for medications requested? No   Received Request Via: Pharmacy

## 2018-08-22 RX ORDER — ATORVASTATIN CALCIUM 20 MG/1
20 TABLET, FILM COATED ORAL DAILY
Qty: 90 TAB | Refills: 0 | Status: SHIPPED | OUTPATIENT
Start: 2018-08-22 | End: 2018-12-24 | Stop reason: SDUPTHER

## 2018-12-24 DIAGNOSIS — E11.9 TYPE 2 DIABETES MELLITUS WITHOUT COMPLICATION, WITHOUT LONG-TERM CURRENT USE OF INSULIN (HCC): ICD-10-CM

## 2018-12-24 NOTE — TELEPHONE ENCOUNTER
Was the patient seen in the last year in this department? Yes   Last seen on 3/5/18 by Dr. Yarelis Guillermo Appt None    Does patient have an active prescription for medications requested? No     Received Request Via: Pharmacy

## 2018-12-26 RX ORDER — ATORVASTATIN CALCIUM 20 MG/1
20 TABLET, FILM COATED ORAL DAILY
Qty: 90 TAB | Refills: 0 | Status: SHIPPED | OUTPATIENT
Start: 2018-12-26 | End: 2018-12-27 | Stop reason: SDUPTHER

## 2018-12-27 DIAGNOSIS — E11.9 TYPE 2 DIABETES MELLITUS WITHOUT COMPLICATION, WITHOUT LONG-TERM CURRENT USE OF INSULIN (HCC): ICD-10-CM

## 2018-12-27 RX ORDER — ATORVASTATIN CALCIUM 20 MG/1
20 TABLET, FILM COATED ORAL DAILY
Qty: 90 TAB | Refills: 0 | Status: SHIPPED | OUTPATIENT
Start: 2018-12-27 | End: 2021-03-15 | Stop reason: SDUPTHER

## 2019-12-15 NOTE — TELEPHONE ENCOUNTER
Last seen: 09/14/17 by Dr. Santoyo  Next appt: 12/28/17 with Dr. Santoyo    Was the patient seen in the last year in this department? Yes   Does patient have an active prescription for medications requested? No   Received Request Via: Pharmacy  
unable to follow commands

## 2020-12-15 ENCOUNTER — OFFICE VISIT (OUTPATIENT)
Dept: MEDICAL GROUP | Facility: PHYSICIAN GROUP | Age: 65
End: 2020-12-15
Payer: COMMERCIAL

## 2020-12-15 VITALS
OXYGEN SATURATION: 95 % | HEIGHT: 66 IN | BODY MASS INDEX: 23.7 KG/M2 | DIASTOLIC BLOOD PRESSURE: 60 MMHG | SYSTOLIC BLOOD PRESSURE: 100 MMHG | WEIGHT: 147.49 LBS | TEMPERATURE: 96.8 F | HEART RATE: 85 BPM

## 2020-12-15 DIAGNOSIS — L24.89 IRRITANT CONTACT DERMATITIS DUE TO OTHER AGENTS: ICD-10-CM

## 2020-12-15 DIAGNOSIS — Z12.11 SCREEN FOR COLON CANCER: ICD-10-CM

## 2020-12-15 DIAGNOSIS — I10 ESSENTIAL HYPERTENSION: ICD-10-CM

## 2020-12-15 DIAGNOSIS — E78.5 DYSLIPIDEMIA: ICD-10-CM

## 2020-12-15 DIAGNOSIS — Z23 NEED FOR 23-POLYVALENT PNEUMOCOCCAL POLYSACCHARIDE VACCINE: ICD-10-CM

## 2020-12-15 DIAGNOSIS — Z23 NEED FOR IMMUNIZATION AGAINST INFLUENZA: ICD-10-CM

## 2020-12-15 DIAGNOSIS — E11.9 TYPE 2 DIABETES MELLITUS WITHOUT COMPLICATION, WITHOUT LONG-TERM CURRENT USE OF INSULIN (HCC): ICD-10-CM

## 2020-12-15 PROCEDURE — 90471 IMMUNIZATION ADMIN: CPT | Performed by: FAMILY MEDICINE

## 2020-12-15 PROCEDURE — 99204 OFFICE O/P NEW MOD 45 MIN: CPT | Mod: 25 | Performed by: FAMILY MEDICINE

## 2020-12-15 PROCEDURE — 90732 PPSV23 VACC 2 YRS+ SUBQ/IM: CPT | Performed by: FAMILY MEDICINE

## 2020-12-15 PROCEDURE — 90662 IIV NO PRSV INCREASED AG IM: CPT | Performed by: FAMILY MEDICINE

## 2020-12-15 PROCEDURE — 90472 IMMUNIZATION ADMIN EACH ADD: CPT | Performed by: FAMILY MEDICINE

## 2020-12-15 RX ORDER — CLOBETASOL PROPIONATE 0.5 MG/G
CREAM TOPICAL
Qty: 45 G | Refills: 1 | Status: SHIPPED | OUTPATIENT
Start: 2020-12-15 | End: 2021-06-30 | Stop reason: SDUPTHER

## 2020-12-15 RX ORDER — EMPAGLIFLOZIN 25 MG/1
TABLET, FILM COATED ORAL DAILY
COMMUNITY
End: 2021-03-15 | Stop reason: SDUPTHER

## 2020-12-15 ASSESSMENT — PATIENT HEALTH QUESTIONNAIRE - PHQ9: CLINICAL INTERPRETATION OF PHQ2 SCORE: 0

## 2020-12-15 NOTE — LETTER
[email or web address]  Alejandrina Law M.D.  [address]  [place] [address]  Fax: [phone]   Authorization for Release/Disclosure of   Protected Health Information   Name: TREY AIKEN JR. : 1955 SSN: [id number]   Address:  [address]  [place] [address] Phone:    There are no phone numbers on file.   I authorize the entity listed below to release/disclose the PHI below to:   [employer] [facility]/Alejandrina Law M.D. and Alejandrina Law M.D.   Provider or Entity Name:     Address   City, State, Zip   Phone:      Fax:     Reason for request: continuity of care   Information to be released:    [  ] LAST COLONOSCOPY,  including any PATH REPORT and follow-up  [  ] LAST FIT/COLOGUARD RESULT [  ] LAST DEXA  [  ] LAST MAMMOGRAM  [  ] LAST PAP  [  ] LAST LABS [  ] RETINA EXAM REPORT  [  ] IMMUNIZATION RECORDS  [  ] Release all info      [  ] Check here and initial the line next to each item to release ALL health information INCLUDING  _____ Care and treatment for drug and / or alcohol abuse  _____ HIV testing, infection status, or AIDS  _____ Genetic Testing    DATES OF SERVICE OR TIME PERIOD TO BE DISCLOSED: _____________  I understand and acknowledge that:  * This Authorization may be revoked at any time by you in writing, except if your health information has already been used or disclosed.  * Your health information that will be used or disclosed as a result of you signing this authorization could be re-disclosed by the recipient. If this occurs, your re-disclosed health information may no longer be protected by State or Federal laws.  * You may refuse to sign this Authorization. Your refusal will not affect your ability to obtain treatment.  * This Authorization becomes effective upon signing and will  on (date) __________.      If no date is indicated, this Authorization will  one (1) year from the signature date.    Name: Trey Aiken Jr.    Signature:   Date:     12/15/2020       PLEASE FAX  REQUESTED RECORDS BACK TO: [phone]

## 2020-12-15 NOTE — LETTER
[facility]  Alejandrina Law M.D.  [address]  [place] [address]  Fax: [phone]   Authorization for Release/Disclosure of   Protected Health Information   Name: TREY AIKEN JR. : 1955 SSN: [id number]   Address:  [address]  [place] [address] Phone:    There are no phone numbers on file.   I authorize the entity listed below to release/disclose the PHI below to:   [facility]/Alejandrina Law M.D. and Alejandrina Law M.D.   Provider or Entity Name:    [facility]   Address   City, State, Zip   Phone:      Fax:     Reason for request: continuity of care   Information to be released:    [  ] LAST COLONOSCOPY,  including any PATH REPORT and follow-up  [  ] LAST FIT/COLOGUARD RESULT [  ] LAST DEXA  [  ] LAST MAMMOGRAM  [  ] LAST PAP  [  ] LAST LABS [  ] RETINA EXAM REPORT  [  ] IMMUNIZATION RECORDS  [ x ] Release all info      [  ] Check here and initial the line next to each item to release ALL health information INCLUDING  _____ Care and treatment for drug and / or alcohol abuse  _____ HIV testing, infection status, or AIDS  _____ Genetic Testing    DATES OF SERVICE OR TIME PERIOD TO BE DISCLOSED: _____________  I understand and acknowledge that:  * This Authorization may be revoked at any time by you in writing, except if your health information has already been used or disclosed.  * Your health information that will be used or disclosed as a result of you signing this authorization could be re-disclosed by the recipient. If this occurs, your re-disclosed health information may no longer be protected by State or Federal laws.  * You may refuse to sign this Authorization. Your refusal will not affect your ability to obtain treatment.  * This Authorization becomes effective upon signing and will  on (date) __________.      If no date is indicated, this Authorization will  one (1) year from the signature date.    Name: Trey Aiken Jr.    Signature:   Date:          12/15/2020       PLEASE FAX REQUESTED RECORDS BACK TO: [phone]

## 2020-12-16 NOTE — PROGRESS NOTES
Subjective:      Virgilio Lopez Jr. is a 65 y.o. male who presents with New Patient            HPI     This is a 65-year-old Stateless male who is here as a new patient to me.  He is accompanied by his granddaughter.    He was previously under the care of [facility] internal medicine group and then by the [facility].  He said his last visit at the [facility] was around March 2020.    Patient has diabetes mellitus type 2 and he is currently taking Jardiance 25 mg daily and Metformin 1000 mg twice a day.  He said he was given a third agent which was Tradjenta 5 mg daily but he said he only took it couple of times because of itching without a rash.  He does not check his blood sugars at home.  He said his last blood work was probably in March 2020 but he could not remember what his hemoglobin A1c was but he said for sure it was over 7.0.    He also has dyslipidemia and he is on atorvastatin 20 mg daily.  Denies any myalgias.    He takes lisinopril 20 mg daily for hypertension.  His blood pressure is running under control.    He has been having problems with dry itchy rash on the dorsal surfaces of the hands and the fingers.  He said this has been ongoing for several months now.  He said he wears protective gloves at work but he does not know the type of material.  He said the rash is almost like burn has already scabbed.  They do not blister up but sometimes they get very red and swollen.  He was given Lotrisone cream (combination antifungal/steroid) but this has not been helping.    He said he already had cataract surgery done of both eyes and he is followed by his ophthalmologist with the next eye exam scheduled in January 2021.  He has not had a colonoscopy before.    He also has history of pulmonary tuberculosis which was treated under the supervision of the health department back in 2015.  At that time he developed elevation of liver enzymes thought to be from rifampin toxicity.  He said he  finished the TB treatment.    I reviewed the following    Past Medical History:   Diagnosis Date   • Diabetes (HCC)    • Dyslipidemia    • History of TB (tuberculosis)    • Hypertension         Past Surgical History:   Procedure Laterality Date   • CATARACT EXTRACTION WITH IOL Bilateral        No Known Allergies    Current Outpatient Medications   Medication Sig Dispense Refill   • Empagliflozin (JARDIANCE) 25 MG Tab Take  by mouth every day at 6 PM.     • clobetasol (TEMOVATE) 0.05 % Cream Apply to affected areas twice daily. 45 g 1   • atorvastatin (LIPITOR) 20 MG Tab Take 1 Tab by mouth every day. 90 Tab 0   • metformin (GLUCOPHAGE) 1000 MG tablet Take 1 Tab by mouth 2 times a day, with meals. 180 Tab 0   • lisinopril (PRINIVIL) 20 MG Tab TAKE 1 TAB BY MOUTH EVERY DAY. 90 Tab 2   • Blood Glucose Monitoring Suppl Supplies Misc Test strips order: Test strips for One Touch Ultra 2 meter. Sig: use  TID and prn ssx high or low sugar #100 RF x 3 300 Each 3   • Lancets Misc Lancets order: Lancets for One Touch Ultra 2 meter. Sig: use TID and prn ssx high or low sugar. #100 RF x 3 300 Each 3   • Blood Glucose Monitoring Suppl Device Meter: Dispense One Touch Ultra 2 system . Sig. Use to check blood sugars 3 times daily DX:E11.9 1 Device 0     No current facility-administered medications for this visit.         Family History   Problem Relation Age of Onset   • Cancer Brother         lung cancer   • Hypertension Mother    • Other Father         liver cirrhosis   • Psychiatric Illness Brother    • Other Brother         pulmonary TB   • Stroke Brother        Social History     Socioeconomic History   • Marital status:      Spouse name: Not on file   • Number of children: Not on file   • Years of education: Not on file   • Highest education level: Not on file   Occupational History   • Occupation: skilled    Social Needs   • Financial resource strain: Not on file   • Food insecurity     Worry: Not on  file     Inability: Not on file   • Transportation needs     Medical: Not on file     Non-medical: Not on file   Tobacco Use   • Smoking status: Former Smoker     Packs/day: 0.50     Years: 32.00     Pack years: 16.00     Types: Cigarettes     Quit date: 1988     Years since quittin.3   • Smokeless tobacco: Never Used   Substance and Sexual Activity   • Alcohol use: Yes     Comment: 1/month    • Drug use: No   • Sexual activity: Not on file   Lifestyle   • Physical activity     Days per week: Not on file     Minutes per session: Not on file   • Stress: Not on file   Relationships   • Social connections     Talks on phone: Not on file     Gets together: Not on file     Attends Che[employer] service: Not on file     Active member of club or organization: Not on file     Attends meetings of clubs or organizations: Not on file     Relationship status: Not on file   • Intimate partner violence     Fear of current or ex partner: Not on file     Emotionally abused: Not on file     Physically abused: Not on file     Forced sexual activity: Not on file   Other Topics Concern   • Not on file   Social History Narrative   • Not on file        ROS     Review of Systems  Constitutional: Negative for fever, chills, weight loss and malaise/fatigue.   HEENT: Negative for ear pain, nosebleeds, congestion, sore throat and neck pain.    Eyes: Negative for blurred vision.   Respiratory: Negative for cough, sputum production, shortness of breath and wheezing.    Cardiovascular: Negative for chest pain, palpitations, orthopnea and leg swelling.   Gastrointestinal: Negative for heartburn, nausea, vomiting and abdominal pain.   Genitourinary: Negative for dysuria, urgency and frequency.   Musculoskeletal: Negative for myalgias, back pain and joint pain.   Skin: Positive rash with itching  Neurological: Negative for dizziness, tingling, tremors, sensory change, focal weakness and headaches.   Endo/Heme/Allergies: Does not bruise/bleed  "easily.   Psychiatric/Behavioral: Negative for depression, anxiety, or memory loss.     All other systems reviewed and are negative except as in HPI.         Objective:     /60 (BP Location: Left arm, Patient Position: Sitting, BP Cuff Size: Adult)   Pulse 85   Temp 36 °C (96.8 °F) (Temporal)   Ht 1.664 m (5' 5.5\")   Wt 66.9 kg (147 lb 7.8 oz)   SpO2 95%   BMI 24.17 kg/m²      Physical Exam  Vitals signs and nursing note reviewed.   Constitutional:       General: He is not in acute distress.     Appearance: He is well-developed. He is not diaphoretic.   HENT:      Head: Normocephalic and atraumatic.      Right Ear: External ear normal.      Left Ear: External ear normal.      Nose: Nose normal.      Mouth/Throat:      Pharynx: No oropharyngeal exudate.   Eyes:      General: No scleral icterus.        Right eye: No discharge.         Left eye: No discharge.      Conjunctiva/sclera: Conjunctivae normal.      Pupils: Pupils are equal, round, and reactive to light.   Neck:      Musculoskeletal: Normal range of motion and neck supple.      Thyroid: No thyromegaly.      Vascular: No JVD.      Trachea: No tracheal deviation.   Cardiovascular:      Rate and Rhythm: Normal rate and regular rhythm.      Heart sounds: Normal heart sounds. No murmur. No friction rub. No gallop.    Pulmonary:      Effort: Pulmonary effort is normal. No respiratory distress.      Breath sounds: Normal breath sounds. No stridor. No wheezing or rales.   Chest:      Chest wall: No tenderness.   Abdominal:      General: Bowel sounds are normal. There is no distension.      Palpations: Abdomen is soft. There is no mass.      Tenderness: There is no abdominal tenderness. There is no guarding or rebound.      Hernia: No hernia is present.   Musculoskeletal: Normal range of motion.         General: No tenderness or deformity.   Lymphadenopathy:      Cervical: No cervical adenopathy.   Skin:     General: Skin is warm and dry.      Coloration: " Skin is not pale.      Findings: No erythema or rash.      Comments: Diffuse annular rash with coalescence diffusely covering the dorsal surfaces of both hands extending into the dorsal surfaces of some of the fingers which is dry and scabbed some with peeling exposing raw skin in the wrist but no discharge or infection   Neurological:      Mental Status: He is alert and oriented to person, place, and time.      Cranial Nerves: No cranial nerve deficit.      Motor: No abnormal muscle tone.      Coordination: Coordination normal.      Deep Tendon Reflexes: Reflexes are normal and symmetric. Reflexes normal.   Psychiatric:         Behavior: Behavior normal.         Thought Content: Thought content normal.         Judgment: Judgment normal.                      Assessment/Plan:        1. Type 2 diabetes mellitus without complication, without long-term current use of insulin (HCC)  He will do blood work as soon as possible to check on the status of his diabetes.  I will keep him on 2 agents for now Metformin and Jardiance and he is already on maximum doses of these meds.  He develop a reaction to Tradjenta which was itching.  If diabetes is out of control we will consider adding another oral agent.  I will follow up in 3 months.  Advised to keep his appointment with his eye specialist in January and to send there findings to me.  - Lipid Profile; Future  - Comp Metabolic Panel; Future  - HEMOGLOBIN A1C; Future  - CBC WITH DIFFERENTIAL; Future  - MICROALBUMIN CREAT RATIO URINE; Future    2. Dyslipidemia  We will do updated lipid panel and he will do fasting lipid panel as soon as possible.  - Lipid Profile; Future  - Comp Metabolic Panel; Future  - HEMOGLOBIN A1C; Future  - CBC WITH DIFFERENTIAL; Future  - MICROALBUMIN CREAT RATIO URINE; Future    3. Essential hypertension  Controlled on lisinopril.  - Lipid Profile; Future  - Comp Metabolic Panel; Future  - HEMOGLOBIN A1C; Future  - CBC WITH DIFFERENTIAL; Future  -  MICROALBUMIN CREAT RATIO URINE; Future    4. Irritant contact dermatitis due to other agents  His rash is probably irritant contact dermatitis from the gloves that he wears at work.  Advised to message me the material of the gloves so I will know what type of gloves he wears.  Advised to wear cotton gloves instead.  Clobetasol cream apply thinly to affected areas twice daily.  I will reevaluate next visit.  - clobetasol (TEMOVATE) 0.05 % Cream; Apply to affected areas twice daily.  Dispense: 45 g; Refill: 1    5. Screen for colon cancer  He has not had any colonoscopy before.  Referral placed to GI specialist.  - REFERRAL TO GASTROENTEROLOGY    6. Need for immunization against influenza  Due for flu shot and this was given.  - INFLUENZA VACCINE, HIGH DOSE (65+ ONLY)    7. Need for 23-polyvalent pneumococcal polysaccharide vaccine  Is due for Pneumovax 23 and this was given.  - Pneumococal Polysaccharide Vaccine 23-Valent =>1YO SQ/IM    Follow-up in 3 months or sooner if needed.  We will get all records from [facility].      Please note that this dictation was created using voice recognition software. I have worked with consultants from the vendor as well as technical experts from [facility] to optimize the interface. I have made every reasonable attempt to correct obvious errors, but I expect that there are errors of grammar and possibly content I did not discover before finalizing the note.

## 2021-03-02 LAB
ALBUMIN SERPL-MCNC: 4.1 G/DL (ref 3.8–4.8)
ALBUMIN/CREAT UR: 681 MG/G CREAT (ref 0–29)
ALBUMIN/GLOB SERPL: 1 {RATIO} (ref 1.2–2.2)
ALP SERPL-CCNC: 178 IU/L (ref 39–117)
ALT SERPL-CCNC: 70 IU/L (ref 0–44)
AST SERPL-CCNC: 50 IU/L (ref 0–40)
BASOPHILS # BLD AUTO: 0 X10E3/UL (ref 0–0.2)
BASOPHILS NFR BLD AUTO: 1 %
BILIRUB SERPL-MCNC: 1.1 MG/DL (ref 0–1.2)
BUN SERPL-MCNC: 14 MG/DL (ref 8–27)
BUN/CREAT SERPL: 17 (ref 10–24)
CALCIUM SERPL-MCNC: 9.1 MG/DL (ref 8.6–10.2)
CHLORIDE SERPL-SCNC: 98 MMOL/L (ref 96–106)
CHOLEST SERPL-MCNC: 156 MG/DL (ref 100–199)
CO2 SERPL-SCNC: 21 MMOL/L (ref 20–29)
CREAT SERPL-MCNC: 0.83 MG/DL (ref 0.76–1.27)
CREAT UR-MCNC: 55 MG/DL
EOSINOPHIL # BLD AUTO: 0.1 X10E3/UL (ref 0–0.4)
EOSINOPHIL NFR BLD AUTO: 1 %
ERYTHROCYTE [DISTWIDTH] IN BLOOD BY AUTOMATED COUNT: 12.2 % (ref 11.6–15.4)
GLOBULIN SER CALC-MCNC: 4 G/DL (ref 1.5–4.5)
GLUCOSE SERPL-MCNC: 187 MG/DL (ref 65–99)
HBA1C MFR BLD: 10.4 % (ref 4.8–5.6)
HCT VFR BLD AUTO: 51.8 % (ref 37.5–51)
HDLC SERPL-MCNC: 85 MG/DL
HGB BLD-MCNC: 17.7 G/DL (ref 13–17.7)
IMM GRANULOCYTES # BLD AUTO: 0 X10E3/UL (ref 0–0.1)
IMM GRANULOCYTES NFR BLD AUTO: 1 %
IMMATURE CELLS  115398: ABNORMAL
LABORATORY COMMENT REPORT: NORMAL
LDLC SERPL CALC-MCNC: 53 MG/DL (ref 0–99)
LYMPHOCYTES # BLD AUTO: 2.1 X10E3/UL (ref 0.7–3.1)
LYMPHOCYTES NFR BLD AUTO: 26 %
MCH RBC QN AUTO: 33 PG (ref 26.6–33)
MCHC RBC AUTO-ENTMCNC: 34.2 G/DL (ref 31.5–35.7)
MCV RBC AUTO: 97 FL (ref 79–97)
MICROALBUMIN UR-MCNC: 374.8 UG/ML
MONOCYTES # BLD AUTO: 0.6 X10E3/UL (ref 0.1–0.9)
MONOCYTES NFR BLD AUTO: 7 %
MORPHOLOGY BLD-IMP: ABNORMAL
NEUTROPHILS # BLD AUTO: 5.3 X10E3/UL (ref 1.4–7)
NEUTROPHILS NFR BLD AUTO: 64 %
NRBC BLD AUTO-RTO: ABNORMAL %
PLATELET # BLD AUTO: 194 X10E3/UL (ref 150–450)
POTASSIUM SERPL-SCNC: 4.1 MMOL/L (ref 3.5–5.2)
PROT SERPL-MCNC: 8.1 G/DL (ref 6–8.5)
RBC # BLD AUTO: 5.37 X10E6/UL (ref 4.14–5.8)
SODIUM SERPL-SCNC: 135 MMOL/L (ref 134–144)
TRIGL SERPL-MCNC: 101 MG/DL (ref 0–149)
VLDLC SERPL CALC-MCNC: 18 MG/DL (ref 5–40)
WBC # BLD AUTO: 8.1 X10E3/UL (ref 3.4–10.8)

## 2021-03-03 DIAGNOSIS — Z23 NEED FOR VACCINATION: ICD-10-CM

## 2021-03-15 ENCOUNTER — OFFICE VISIT (OUTPATIENT)
Dept: MEDICAL GROUP | Facility: PHYSICIAN GROUP | Age: 66
End: 2021-03-15
Payer: COMMERCIAL

## 2021-03-15 VITALS
OXYGEN SATURATION: 94 % | TEMPERATURE: 96.6 F | BODY MASS INDEX: 23.81 KG/M2 | HEIGHT: 66 IN | HEART RATE: 83 BPM | SYSTOLIC BLOOD PRESSURE: 110 MMHG | WEIGHT: 148.15 LBS | DIASTOLIC BLOOD PRESSURE: 60 MMHG

## 2021-03-15 DIAGNOSIS — E11.65 UNCONTROLLED TYPE 2 DIABETES MELLITUS WITH HYPERGLYCEMIA (HCC): ICD-10-CM

## 2021-03-15 DIAGNOSIS — I10 ESSENTIAL HYPERTENSION: ICD-10-CM

## 2021-03-15 DIAGNOSIS — Z12.5 SCREENING FOR PROSTATE CANCER: ICD-10-CM

## 2021-03-15 DIAGNOSIS — L24.89 IRRITANT CONTACT DERMATITIS DUE TO OTHER AGENTS: ICD-10-CM

## 2021-03-15 DIAGNOSIS — R74.8 ELEVATED LIVER ENZYMES: ICD-10-CM

## 2021-03-15 DIAGNOSIS — J20.9 ACUTE BRONCHITIS, UNSPECIFIED ORGANISM: ICD-10-CM

## 2021-03-15 DIAGNOSIS — E78.5 DYSLIPIDEMIA: ICD-10-CM

## 2021-03-15 PROCEDURE — 99214 OFFICE O/P EST MOD 30 MIN: CPT | Performed by: FAMILY MEDICINE

## 2021-03-15 RX ORDER — AZITHROMYCIN 250 MG/1
TABLET, FILM COATED ORAL
Qty: 6 TABLET | Refills: 0 | Status: SHIPPED | OUTPATIENT
Start: 2021-03-15 | End: 2022-03-14

## 2021-03-15 RX ORDER — LISINOPRIL 20 MG/1
20 TABLET ORAL DAILY
Qty: 90 TABLET | Refills: 1 | Status: SHIPPED | OUTPATIENT
Start: 2021-03-15 | End: 2021-03-15

## 2021-03-15 RX ORDER — GLIPIZIDE 5 MG/1
5 TABLET, FILM COATED, EXTENDED RELEASE ORAL DAILY
Qty: 90 TABLET | Refills: 1 | Status: SHIPPED | OUTPATIENT
Start: 2021-03-15 | End: 2021-08-18 | Stop reason: SDUPTHER

## 2021-03-15 RX ORDER — EMPAGLIFLOZIN 25 MG/1
1 TABLET, FILM COATED ORAL DAILY
Qty: 90 TABLET | Refills: 1 | Status: SHIPPED | OUTPATIENT
Start: 2021-03-15 | End: 2021-08-18

## 2021-03-15 RX ORDER — LISINOPRIL 10 MG/1
10 TABLET ORAL DAILY
Qty: 90 TABLET | Refills: 1 | Status: SHIPPED | OUTPATIENT
Start: 2021-03-15 | End: 2022-01-09

## 2021-03-15 RX ORDER — ATORVASTATIN CALCIUM 20 MG/1
20 TABLET, FILM COATED ORAL DAILY
Qty: 90 TABLET | Refills: 1 | Status: SHIPPED | OUTPATIENT
Start: 2021-03-15 | End: 2021-11-02 | Stop reason: SDUPTHER

## 2021-03-15 ASSESSMENT — PATIENT HEALTH QUESTIONNAIRE - PHQ9: CLINICAL INTERPRETATION OF PHQ2 SCORE: 0

## 2021-03-15 ASSESSMENT — FIBROSIS 4 INDEX: FIB4 SCORE: 2.06

## 2021-03-15 NOTE — LETTER
[employer] [facility]  Alejandrina Law M.D.  [address]  [place] [address]  Fax: [phone]   Authorization for Release/Disclosure of   Protected Health Information   Name: TREY AIKEN JR. : 1955 SSN: [id number]   Address:  [address] [facility] [facility] [address]  [place] [address] Phone:    [phone] (home)    I authorize the entity listed below to release/disclose the PHI below to:   [facility]/Alejandrina Law M.D. and Alejandrina Law M.D.   Provider or Entity Name:    [facility]   Address   City, State, Zip   Phone:      Fax:     Reason for request: continuity of care   Information to be released:    [  ] LAST COLONOSCOPY,  including any PATH REPORT and follow-up  [  ] LAST FIT/COLOGUARD RESULT [  ] LAST DEXA  [  ] LAST MAMMOGRAM  [  ] LAST PAP  [  ] LAST LABS [  ] RETINA EXAM REPORT  [  ] IMMUNIZATION RECORDS  [  ] Release all info      [  ] Check here and initial the line next to each item to release ALL health information INCLUDING  _____ Care and treatment for drug and / or alcohol abuse  _____ HIV testing, infection status, or AIDS  _____ Genetic Testing    DATES OF SERVICE OR TIME PERIOD TO BE DISCLOSED: _____________  I understand and acknowledge that:  * This Authorization may be revoked at any time by you in writing, except if your health information has already been used or disclosed.  * Your health information that will be used or disclosed as a result of you signing this authorization could be re-disclosed by the recipient. If this occurs, your re-disclosed health information may no longer be protected by State or Federal laws.  * You may refuse to sign this Authorization. Your refusal will not affect your ability to obtain treatment.  * This Authorization becomes effective upon signing and will  on (date) __________.      If no date is indicated, this Authorization will  one (1) year from the signature date.    Name: Trey Aiken Jr.    Signature:   Date:     3/15/2021       PLEASE FAX  REQUESTED RECORDS BACK TO: [phone]

## 2021-03-15 NOTE — PROGRESS NOTES
Subjective:      Virgilio Lopez Jr. is a 65 y.o. male who presents with Diabetes            HPI     Patient returns for follow-up and he is accompanied by his daughter.    In terms of diabetes mellitus type 2, he continues to take Metformin 1000 mg twice a day and Jardiance 25 mg daily.  I reviewed his old records and his last hemoglobin A1c was 9.1% in July 2020.  He admits he has not been watching his diet well.  He consumes pineapple juice and diet Coke according to the daughter.  He himself admits to not watching his portion of the rice.  He said he will is already scheduled to have his retinal exam this coming April.  He is due for monofilament test.    In terms of the dyslipidemia, he continues to take atorvastatin without myalgias.    For his hypertension, he continues to take lisinopril.  He said he has dry cough occasionally but not bothersome enough for him to stop taking the medication.    We have given him clobetasol cream for severe type of irritant contact dermatitis on his hands.  He has been using the cream with very good results.  He only has very minimal breakout from time to time which the cream works very well for her.    He has known elevated liver enzymes due to rifampin and INH toxicity when he was treated for pulmonary TB back in 2015.  I have reviewed his records and he had additional work-up for etiology of the elevation of liver enzymes with negative viral hepatitis panel for A, B, C, negative JACIEL and F-actin was normal.  He had abdominal ultrasound that no evidence of dilated bile duct, normal liver appearance, limited evaluation of the gallbladder and pancreas due to bowel gas.  Denies any abdominal pain, nausea, vomiting.    He complains of 1 week duration of cough with yellowish phlegm.  Denies any fever, chills, shortness of breath.  Denies exposure to COVID-19 case.    He said he has been having frequent urination at night about 4 times for the last 1 year.  Denies any problems with his  "urine stream.  Denies any dysuria or blood in the urine.    He said his last colonoscopy was less than 10 years ago.  We do not have the colonoscopy report.  He could not remember which GI group did his colonoscopy.    Past medical history, past surgical history, family history reviewed-no changes    Social history reviewed-no changes    Allergies reviewed-no changes    Medications reviewed-no changes    ROS     As per HPI, the rest are negative.       Objective:     /60 (BP Location: Left arm, Patient Position: Sitting, BP Cuff Size: Adult)   Pulse 83   Temp 35.9 °C (96.6 °F) (Temporal)   Ht 1.664 m (5' 5.5\")   Wt 67.2 kg (148 lb 2.4 oz)   SpO2 94%   BMI 24.28 kg/m²      Physical Exam     Examined alert, awake, oriented, not in distress    Neck-supple, no lymphadenopathy, no thyromegaly  Lungs-clear to auscultation, no rales, no wheezes  Heart-regular rate and rhythm, no murmur  Extremities-no edema, clubbing, cyanosis  Skin-very minimal scattered dry scaly hyperkeratotic rash dorsal aspect of the hands significantly improved compared to previous exam.  Monofilament testing with a 10 gram force: sensation intact: intact bilaterally  Visual Inspection: Feet without maceration, ulcers, fissures.  Pedal pulses: intact bilaterally       Orders Only on 03/01/2021   Component Date Value Ref Range Status   • WBC 03/01/2021 8.1  3.4 - 10.8 x10E3/uL Final   • RBC 03/01/2021 5.37  4.14 - 5.80 x10E6/uL Final   • Hemoglobin 03/01/2021 17.7  13.0 - 17.7 g/dL Final   • Hematocrit 03/01/2021 51.8* 37.5 - 51.0 % Final   • MCV 03/01/2021 97  79 - 97 fL Final   • MCH 03/01/2021 33.0  26.6 - 33.0 pg Final   • MCHC 03/01/2021 34.2  31.5 - 35.7 g/dL Final   • RDW 03/01/2021 12.2  11.6 - 15.4 % Final   • Platelet Count 03/01/2021 194  150 - 450 x10E3/uL Final   • Neutrophils-Polys 03/01/2021 64  Not Estab. % Final   • Lymphocytes 03/01/2021 26  Not Estab. % Final   • Monocytes 03/01/2021 7  Not Estab. % Final   • Eosinophils " 03/01/2021 1  Not Estab. % Final   • Basophils 03/01/2021 1  Not Estab. % Final   • Immature Cells 03/01/2021 CANCELED   Final    Result canceled by the ancillary.   • Neutrophils (Absolute) 03/01/2021 5.3  1.4 - 7.0 x10E3/uL Final   • Lymphs (Absolute) 03/01/2021 2.1  0.7 - 3.1 x10E3/uL Final   • Monos (Absolute) 03/01/2021 0.6  0.1 - 0.9 x10E3/uL Final   • Eos (Absolute) 03/01/2021 0.1  0.0 - 0.4 x10E3/uL Final   • Baso (Absolute) 03/01/2021 0.0  0.0 - 0.2 x10E3/uL Final   • Immature Granulocytes 03/01/2021 1  Not Estab. % Final   • Immature Granulocytes (abs) 03/01/2021 0.0  0.0 - 0.1 x10E3/uL Final   • Nucleated RBC 03/01/2021 CANCELED   Final    Result canceled by the ancillary.   • Comments-Diff 03/01/2021 CANCELED   Final    Result canceled by the ancillary.   • Glucose 03/01/2021 187* 65 - 99 mg/dL Final   • Bun 03/01/2021 14  8 - 27 mg/dL Final   • Creatinine 03/01/2021 0.83  0.76 - 1.27 mg/dL Final   • GFR If Non  03/01/2021 91  >59 mL/min/1.73 Final   • GFR If  03/01/2021 105  >59 mL/min/1.73 Final   • Bun-Creatinine Ratio 03/01/2021 17  10 - 24 Final   • Sodium 03/01/2021 135  134 - 144 mmol/L Final   • Potassium 03/01/2021 4.1  3.5 - 5.2 mmol/L Final   • Chloride 03/01/2021 98  96 - 106 mmol/L Final   • Co2 03/01/2021 21  20 - 29 mmol/L Final   • Calcium 03/01/2021 9.1  8.6 - 10.2 mg/dL Final   • Total Protein 03/01/2021 8.1  6.0 - 8.5 g/dL Final   • Albumin 03/01/2021 4.1  3.8 - 4.8 g/dL Final   • Globulin 03/01/2021 4.0  1.5 - 4.5 g/dL Final   • A-G Ratio 03/01/2021 1.0* 1.2 - 2.2 Final   • Total Bilirubin 03/01/2021 1.1  0.0 - 1.2 mg/dL Final   • Alkaline Phosphatase 03/01/2021 178* 39 - 117 IU/L Final   • AST(SGOT) 03/01/2021 50* 0 - 40 IU/L Final   • ALT(SGPT) 03/01/2021 70* 0 - 44 IU/L Final   • Cholesterol,Tot 03/01/2021 156  100 - 199 mg/dL Final   • Triglycerides 03/01/2021 101  0 - 149 mg/dL Final   • HDL 03/01/2021 85  >39 mg/dL Final   • VLDL Cholesterol Calc  03/01/2021 18  5 - 40 mg/dL Final   • LDL Chol Calc ([facility]) 03/01/2021 53  0 - 99 mg/dL Final   • Comment: 03/01/2021 CANCELED   Final    Result canceled by the ancillary.   • Creatinine, Random Urine 03/01/2021 55.0  Not Estab. mg/dL Final   • Microalbumin, Urine Random 03/01/2021 374.8  Not Estab. ug/mL Final   • Albumin / Creatinine Ratio 03/01/2021 681* 0 - 29 mg/g creat Final    Comment: Normal:                0 -  29  Moderately increased: 30 - 300  Severely increased:       >300     • Glycohemoglobin 03/01/2021 10.4* 4.8 - 5.6 % Final    Comment: Prediabetes: 5.7 - 6.4  Diabetes: >6.4  Glycemic control for adults with diabetes: <7.0            Assessment/Plan:        1. Uncontrolled type 2 diabetes mellitus with hyperglycemia (HCC)  This is more out of control than before.  There is also microalbuminuria.  Patient is already on ACE inhibitor.  The microalbuminuria is probably from uncontrolled diabetes.  He is already on maximum doses of Metformin and Jardiance.  I will add glipizide XL 5 mg 1 tablet daily to be taken with breakfast.  He does not check his blood sugars at home.  Advised work hard on decreasing portions of carbohydrates specifically rice.  Avoid sugary drinks including juices and soda.  Advised regular exercises.  He was given diet sheet to follow.  I will reevaluate in 3 months with blood work for that visit.  Keep appointment with ophthalmologist for his retinal exam.  - glipiZIDE SR (GLUCOTROL) 5 MG TABLET SR 24 HR; Take 1 tablet by mouth every day.  Dispense: 90 tablet; Refill: 1  - metformin (GLUCOPHAGE) 1000 MG tablet; Take 1 tablet by mouth 2 times a day, with meals.  Dispense: 180 tablet; Refill: 1  - Empagliflozin (JARDIANCE) 25 MG Tab; Take 1 tablet by mouth every day at 6 PM.  Dispense: 90 tablet; Refill: 1  - Comp Metabolic Panel; Future  - Diabetic Monofilament Lower Extremity Exam    2. Dyslipidemia  All at target.  Continue cholesterol medication.  - atorvastatin (LIPITOR) 20 MG  Tab; Take 1 tablet by mouth every day.  Dispense: 90 tablet; Refill: 1  - Lipid Profile; Future  - Comp Metabolic Panel; Future  - HEMOGLOBIN A1C; Future  - PROSTATE SPECIFIC AG SCREENING; Future    3. Essential hypertension  Controlled on lisinopril.  He has minimal dry cough which is not bothersome to him so we will continue the lisinopril.  - lisinopril (PRINIVIL) 20 MG Tab; Take 1 tablet by mouth every day.  Dispense: 90 tablet; Refill: 1  - Lipid Profile; Future  - Comp Metabolic Panel; Future  - HEMOGLOBIN A1C; Future  - PROSTATE SPECIFIC AG SCREENING; Future    4. Irritant contact dermatitis due to other agents  Significantly improved on clobetasol cream.  Continue clobetasol cream as needed.  Reinforced the need to avoid frequent contact with warm water.  Reinforced the need to keep skin moisturized.    5. Elevated liver enzymes  He has ongoing elevation of liver enzymes.  This was first diagnosed in 2015 due to toxicity from rifampin and INH when he was being treated for pulmonary TB.  Other work-up for etiology came back negative for viral hepatitis, autoimmune hepatitis.  We will continue to follow.  He said he consumes alcohol only occasionally.  Advise avoid alcohol.  - Lipid Profile; Future  - Comp Metabolic Panel; Future  - HEMOGLOBIN A1C; Future  - PROSTATE SPECIFIC AG SCREENING; Future    6. Acute bronchitis, unspecified organism  We will treat for possible acute bronchitis with Zithromax Z-BLANK as directed.  Advise increase p.o. fluids.  Daughter will message me if the antibiotics will not work for the symptoms.  - azithromycin (ZITHROMAX) 250 MG Tab; Take 2 tablets the first the first day, then 1 tab daily for 4 days.  Dispense: 6 tablet; Refill: 0    7. Screening for prostate cancer  We will get PSA.  He has been having frequency of urination at night about 4 times in the last 1 year.  Advised avoidance of liquids after dinnertime.  I will do rectal exam to check the prostate if this is still  ongoing next visit.  - Lipid Profile; Future  - Comp Metabolic Panel; Future  - HEMOGLOBIN A1C; Future  - PROSTATE SPECIFIC AG SCREENING; Future    Follow-up in 3 months or sooner if needed.      Please note that this dictation was created using voice recognition software. I have worked with consultants from the vendor as well as technical experts from [facility]  [employer] to optimize the interface. I have made every reasonable attempt to correct obvious errors, but I expect that there are errors of grammar and possibly content I did not discover before finalizing the note.

## 2021-03-15 NOTE — LETTER
[facility]  Alejandrina Law M.D.  [address]  [place] [address]  Fax: [phone]   Authorization for Release/Disclosure of   Protected Health Information   Name: TREY AIKEN JR. : 1955 SSN: [id number]   Address:  [facility] [address]  [place] [address] Phone:    [phone] (home)    I authorize the entity listed below to release/disclose the PHI below to:   [facility]/Alejandrina Law M.D. and Alejandrina Law M.D.   Provider or Entity Name:     Address   City, State, Zip    [facility] Phone:      Fax:     Reason for request: continuity of care   Information to be released:    [x  ] LAST COLONOSCOPY,  including any PATH REPORT and follow-up  [  ] LAST FIT/COLOGUARD RESULT [  ] LAST DEXA  [  ] LAST MAMMOGRAM  [  ] LAST PAP  [  ] LAST LABS [  ] RETINA EXAM REPORT  [  ] IMMUNIZATION RECORDS  [  ] Release all info      [  ] Check here and initial the line next to each item to release ALL health information INCLUDING  _____ Care and treatment for drug and / or alcohol abuse  _____ HIV testing, infection status, or AIDS  _____ Genetic Testing    DATES OF SERVICE OR TIME PERIOD TO BE DISCLOSED: _____________  I understand and acknowledge that:  * This Authorization may be revoked at any time by you in writing, except if your health information has already been used or disclosed.  * Your health information that will be used or disclosed as a result of you signing this authorization could be re-disclosed by the recipient. If this occurs, your re-disclosed health information may no longer be protected by State or Federal laws.  * You may refuse to sign this Authorization. Your refusal will not affect your ability to obtain treatment.  * This Authorization becomes effective upon signing and will  on (date) __________.      If no date is indicated, this Authorization will  one (1) year from the signature date.    Name: Trey Aiken Jr.    Signature:   Date:     3/15/2021       PLEASE FAX REQUESTED  RECORDS BACK TO: [phone]

## 2021-03-15 NOTE — PATIENT INSTRUCTIONS

## 2021-06-29 LAB
ALBUMIN SERPL-MCNC: 4.4 G/DL (ref 3.8–4.8)
ALBUMIN/GLOB SERPL: 1.2 {RATIO} (ref 1.2–2.2)
ALP SERPL-CCNC: 125 IU/L (ref 48–121)
ALT SERPL-CCNC: 42 IU/L (ref 0–44)
AST SERPL-CCNC: 35 IU/L (ref 0–40)
BILIRUB SERPL-MCNC: 1.7 MG/DL (ref 0–1.2)
BUN SERPL-MCNC: 14 MG/DL (ref 8–27)
BUN/CREAT SERPL: 14 (ref 10–24)
CALCIUM SERPL-MCNC: 9.3 MG/DL (ref 8.6–10.2)
CHLORIDE SERPL-SCNC: 105 MMOL/L (ref 96–106)
CHOLEST SERPL-MCNC: 140 MG/DL (ref 100–199)
CO2 SERPL-SCNC: 19 MMOL/L (ref 20–29)
CREAT SERPL-MCNC: 1 MG/DL (ref 0.76–1.27)
GLOBULIN SER CALC-MCNC: 3.6 G/DL (ref 1.5–4.5)
GLUCOSE SERPL-MCNC: 142 MG/DL (ref 65–99)
HBA1C MFR BLD: 7.1 % (ref 4.8–5.6)
HDLC SERPL-MCNC: 69 MG/DL
LABORATORY COMMENT REPORT: NORMAL
LDLC SERPL CALC-MCNC: 56 MG/DL (ref 0–99)
POTASSIUM SERPL-SCNC: 4.3 MMOL/L (ref 3.5–5.2)
PROT SERPL-MCNC: 8 G/DL (ref 6–8.5)
PSA SERPL-MCNC: 0.7 NG/ML (ref 0–4)
SODIUM SERPL-SCNC: 141 MMOL/L (ref 134–144)
TRIGL SERPL-MCNC: 74 MG/DL (ref 0–149)
VLDLC SERPL CALC-MCNC: 15 MG/DL (ref 5–40)

## 2021-06-30 ENCOUNTER — OFFICE VISIT (OUTPATIENT)
Dept: MEDICAL GROUP | Facility: PHYSICIAN GROUP | Age: 66
End: 2021-06-30
Payer: COMMERCIAL

## 2021-06-30 VITALS
OXYGEN SATURATION: 96 % | TEMPERATURE: 97.3 F | SYSTOLIC BLOOD PRESSURE: 130 MMHG | BODY MASS INDEX: 25.05 KG/M2 | HEIGHT: 66 IN | HEART RATE: 83 BPM | DIASTOLIC BLOOD PRESSURE: 80 MMHG | WEIGHT: 155.87 LBS

## 2021-06-30 DIAGNOSIS — Z12.11 SCREEN FOR COLON CANCER: ICD-10-CM

## 2021-06-30 DIAGNOSIS — E78.5 DYSLIPIDEMIA: ICD-10-CM

## 2021-06-30 DIAGNOSIS — R74.8 ELEVATED LIVER ENZYMES: ICD-10-CM

## 2021-06-30 DIAGNOSIS — I10 ESSENTIAL HYPERTENSION: ICD-10-CM

## 2021-06-30 DIAGNOSIS — L24.89 IRRITANT CONTACT DERMATITIS DUE TO OTHER AGENTS: ICD-10-CM

## 2021-06-30 DIAGNOSIS — E11.65 UNCONTROLLED TYPE 2 DIABETES MELLITUS WITH HYPERGLYCEMIA (HCC): ICD-10-CM

## 2021-06-30 DIAGNOSIS — Z23 NEED FOR SHINGLES VACCINE: ICD-10-CM

## 2021-06-30 PROCEDURE — 99214 OFFICE O/P EST MOD 30 MIN: CPT | Mod: 25 | Performed by: FAMILY MEDICINE

## 2021-06-30 PROCEDURE — 90471 IMMUNIZATION ADMIN: CPT | Performed by: FAMILY MEDICINE

## 2021-06-30 PROCEDURE — 90750 HZV VACC RECOMBINANT IM: CPT | Performed by: FAMILY MEDICINE

## 2021-06-30 RX ORDER — CLOBETASOL PROPIONATE 0.5 MG/G
CREAM TOPICAL
Qty: 45 G | Refills: 1 | Status: SHIPPED | OUTPATIENT
Start: 2021-06-30

## 2021-06-30 ASSESSMENT — FIBROSIS 4 INDEX: FIB4 SCORE: 1.87

## 2021-06-30 NOTE — LETTER
June 30, 2021         Patient: Nabeel Lopez Jr.   YOB: 1955   Date of Visit: 6/30/2021           To Whom it May Concern:    Nabeel Lopez was seen in my clinic on 6/30/2021. He {Return to school/sport/work:11008}    If you have any questions or concerns, please don't hesitate to call.        Sincerely,           Alejandrina Law M.D.  Electronically Signed

## 2021-06-30 NOTE — PROGRESS NOTES
Subjective:      Virgilio Lopez Jr. is a 65 y.o. male who presents with Diabetes            HPI   Patient is here for follow-up of his medical problems accompanied by his daughter.    3 months ago, I added glipizide XL 5 mg 1 tablet daily to his regimen of maximum doses of Metformin, Jardiance because his diabetes was out of control with hemoglobin A1c of 10.4.  Denies side effects from the new medication.  Denies having hypoglycemic episodes.  He gained 7 pounds since last visit.  He has not been checking his blood sugar at home.  He said he has been consuming the same amount of rice as before and has not cut back on it.  Blood work was done before this visit.      For the dyslipidemia, he continues to take atorvastatin without myalgias.    Blood pressure is under control on lisinopril.  He also takes the lisinopril for microalbuminuria.    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  She has been worked up for other causes of liver enzyme elevation by GI specialist with negative viral hepatitis panel and negative JACIEL and F-actin.  Abdominal ultrasound did not show any dilated bile duct and liver was with normal appearance but limited evaluation of gallbladder and pancreas due to bowel gas.  Denies any GI symptoms associated with liver enzyme elevation.  He admits he drinks alcohol occasionally.  We have seen ongoing elevation of the liver enzymes with his last blood work.    He ran out of clobetasol cream that we gave to him for irritant contact dermatitis involving the hands.  He now has new rash on the distal forearm on the right.    He is due for screening colonoscopy and for shingles vaccine.    Past medical history, past surgical history, family history reviewed-no changes    Social history reviewed-no changes    Allergies reviewed-no changes    Medications reviewed-no changes    ROS     As per HPI, the rest are negative.        "Objective:     /80 (BP Location: Left arm, Patient Position: Sitting, BP Cuff Size: Adult)   Pulse 83   Temp 36.3 °C (97.3 °F) (Temporal)   Ht 1.664 m (5' 5.5\")   Wt 70.7 kg (155 lb 13.8 oz)   SpO2 96%   BMI 25.54 kg/m²      Physical Exam     Examined alert, awake, oriented, not in distress    Neck-supple, no lymphadenopathy, no thyromegaly  Lungs-clear to auscultation, no rales, no wheezes  Heart-regular rate and rhythm, no murmur  Extremities-no edema, clubbing, cyanosis, eczematous rash like a band distal right forearm, no evidence of any rash on the hands and fingers            Orders Only on 03/01/2021   Component Date Value Ref Range Status   • WBC 03/01/2021 8.1  3.4 - 10.8 x10E3/uL Final   • RBC 03/01/2021 5.37  4.14 - 5.80 x10E6/uL Final   • Hemoglobin 03/01/2021 17.7  13.0 - 17.7 g/dL Final   • Hematocrit 03/01/2021 51.8* 37.5 - 51.0 % Final   • MCV 03/01/2021 97  79 - 97 fL Final   • MCH 03/01/2021 33.0  26.6 - 33.0 pg Final   • MCHC 03/01/2021 34.2  31.5 - 35.7 g/dL Final   • RDW 03/01/2021 12.2  11.6 - 15.4 % Final   • Platelet Count 03/01/2021 194  150 - 450 x10E3/uL Final   • Neutrophils-Polys 03/01/2021 64  Not Estab. % Final   • Lymphocytes 03/01/2021 26  Not Estab. % Final   • Monocytes 03/01/2021 7  Not Estab. % Final   • Eosinophils 03/01/2021 1  Not Estab. % Final   • Basophils 03/01/2021 1  Not Estab. % Final   • Immature Cells 03/01/2021 CANCELED   Final    Result canceled by the ancillary.   • Neutrophils (Absolute) 03/01/2021 5.3  1.4 - 7.0 x10E3/uL Final   • Lymphs (Absolute) 03/01/2021 2.1  0.7 - 3.1 x10E3/uL Final   • Monos (Absolute) 03/01/2021 0.6  0.1 - 0.9 x10E3/uL Final   • Eos (Absolute) 03/01/2021 0.1  0.0 - 0.4 x10E3/uL Final   • Baso (Absolute) 03/01/2021 0.0  0.0 - 0.2 x10E3/uL Final   • Immature Granulocytes 03/01/2021 1  Not Estab. % Final   • Immature Granulocytes (abs) 03/01/2021 0.0  0.0 - 0.1 x10E3/uL Final   • Nucleated RBC 03/01/2021 CANCELED   Final    " Result canceled by the ancillary.   • Comments-Diff 03/01/2021 CANCELED   Final    Result canceled by the ancillary.   • Glucose 03/01/2021 187* 65 - 99 mg/dL Final   • Bun 03/01/2021 14  8 - 27 mg/dL Final   • Creatinine 03/01/2021 0.83  0.76 - 1.27 mg/dL Final   • GFR If Non  03/01/2021 91  >59 mL/min/1.73 Final   • GFR If  03/01/2021 105  >59 mL/min/1.73 Final   • Bun-Creatinine Ratio 03/01/2021 17  10 - 24 Final   • Sodium 03/01/2021 135  134 - 144 mmol/L Final   • Potassium 03/01/2021 4.1  3.5 - 5.2 mmol/L Final   • Chloride 03/01/2021 98  96 - 106 mmol/L Final   • Co2 03/01/2021 21  20 - 29 mmol/L Final   • Calcium 03/01/2021 9.1  8.6 - 10.2 mg/dL Final   • Total Protein 03/01/2021 8.1  6.0 - 8.5 g/dL Final   • Albumin 03/01/2021 4.1  3.8 - 4.8 g/dL Final   • Globulin 03/01/2021 4.0  1.5 - 4.5 g/dL Final   • A-G Ratio 03/01/2021 1.0* 1.2 - 2.2 Final   • Total Bilirubin 03/01/2021 1.1  0.0 - 1.2 mg/dL Final   • Alkaline Phosphatase 03/01/2021 178* 39 - 117 IU/L Final   • AST(SGOT) 03/01/2021 50* 0 - 40 IU/L Final   • ALT(SGPT) 03/01/2021 70* 0 - 44 IU/L Final   • Cholesterol,Tot 03/01/2021 156  100 - 199 mg/dL Final   • Triglycerides 03/01/2021 101  0 - 149 mg/dL Final   • HDL 03/01/2021 85  >39 mg/dL Final   • VLDL Cholesterol Calc 03/01/2021 18  5 - 40 mg/dL Final   • LDL Chol Calc (NIH) 03/01/2021 53  0 - 99 mg/dL Final   • Comment: 03/01/2021 CANCELED   Final    Result canceled by the ancillary.   • Creatinine, Random Urine 03/01/2021 55.0  Not Estab. mg/dL Final   • Microalbumin, Urine Random 03/01/2021 374.8  Not Estab. ug/mL Final   • Albumin / Creatinine Ratio 03/01/2021 681* 0 - 29 mg/g creat Final    Comment: Normal:                0 -  29  Moderately increased: 30 - 300  Severely increased:       >300     • Glycohemoglobin 03/01/2021 10.4* 4.8 - 5.6 % Final    Comment: Prediabetes: 5.7 - 6.4  Diabetes: >6.4  Glycemic control for adults with diabetes: <7.0     • Glucose  06/28/2021 142* 65 - 99 mg/dL Final   • Bun 06/28/2021 14  8 - 27 mg/dL Final   • Creatinine 06/28/2021 1.00  0.76 - 1.27 mg/dL Final   • GFR If Non  06/28/2021 78  >59 mL/min/1.73 Final   • GFR If  06/28/2021 90  >59 mL/min/1.73 Final    Comment: **[facility] currently reports eGFR in compliance with the current**  recommendations of the National Kidney Foundation. [facility] will  update reporting as new guidelines are published from the NKF-ASN  Task force.     • Bun-Creatinine Ratio 06/28/2021 14  10 - 24 Final   • Sodium 06/28/2021 141  134 - 144 mmol/L Final   • Potassium 06/28/2021 4.3  3.5 - 5.2 mmol/L Final   • Chloride 06/28/2021 105  96 - 106 mmol/L Final   • Co2 06/28/2021 19* 20 - 29 mmol/L Final   • Calcium 06/28/2021 9.3  8.6 - 10.2 mg/dL Final   • Total Protein 06/28/2021 8.0  6.0 - 8.5 g/dL Final   • Albumin 06/28/2021 4.4  3.8 - 4.8 g/dL Final   • Globulin 06/28/2021 3.6  1.5 - 4.5 g/dL Final   • A-G Ratio 06/28/2021 1.2  1.2 - 2.2 Final   • Total Bilirubin 06/28/2021 1.7* 0.0 - 1.2 mg/dL Final   • Alkaline Phosphatase 06/28/2021 125* 48 - 121 IU/L Final   • AST(SGOT) 06/28/2021 35  0 - 40 IU/L Final   • ALT(SGPT) 06/28/2021 42  0 - 44 IU/L Final   • Cholesterol,Tot 06/28/2021 140  100 - 199 mg/dL Final   • Triglycerides 06/28/2021 74  0 - 149 mg/dL Final   • HDL 06/28/2021 69  >39 mg/dL Final   • VLDL Cholesterol Calc 06/28/2021 15  5 - 40 mg/dL Final   • LDL Chol Calc (NIH) 06/28/2021 56  0 - 99 mg/dL Final   • Comment: 06/28/2021 CANCELED   Final    Result canceled by the ancillary.   • Glycohemoglobin 06/28/2021 7.1* 4.8 - 5.6 % Final    Comment: Prediabetes: 5.7 - 6.4  Diabetes: >6.4  Glycemic control for adults with diabetes: <7.0     • Prostatic Specific Antigen Tot 06/28/2021 0.7  0.0 - 4.0 ng/mL Final    Comment: Roche ECLIA methodology.  According to the American Urological Association, Serum PSA should  decrease and remain at undetectable levels after  radical  prostatectomy. The AUA defines biochemical recurrence as an initial  PSA value 0.2 ng/mL or greater followed by a subsequent confirmatory  PSA value 0.2 ng/mL or greater.  Values obtained with different assay methods or kits cannot be used  interchangeably. Results cannot be interpreted as absolute evidence  of the presence or absence of malignant disease.                Assessment/Plan:        1. Uncontrolled type 2 diabetes mellitus with hyperglycemia (HCC)  Significantly improved with hemoglobin A1c down from 10.4-7.1.  This is almost at goal.  Continue current medications and current doses.  Advised to decrease the intake of carbs specifically the rice.  I expect that this would go down even lower next visit in 4 months time.  We will do updated blood work at that time.  - Lipid Profile; Future  - Comp Metabolic Panel; Future  - HEMOGLOBIN A1C; Future    2. Dyslipidemia  All at target on atorvastatin.  - Lipid Profile; Future  - Comp Metabolic Panel; Future  - HEMOGLOBIN A1C; Future    3. Essential hypertension  Controlled on lisinopril.  - Lipid Profile; Future  - Comp Metabolic Panel; Future  - HEMOGLOBIN A1C; Future    4. Elevated liver enzymes  The AST and ALT are now back to normal.  Alkaline phosphatase is better and close to normal.  We will continue to follow.  - Lipid Profile; Future  - Comp Metabolic Panel; Future  - HEMOGLOBIN A1C; Future    5. Irritant contact dermatitis due to other agents  I gave him refills of the clobetasol cream.  Reinforced decrease handwashing and if he washes the hands to use cold water instead of warm water.  Advised using Aquaphor in between application of the clobetasol cream to moisturize the hands.  - clobetasol (TEMOVATE) 0.05 % Cream; Apply to affected areas twice daily.  Dispense: 45 g; Refill: 1    6. Screen for colon cancer  He agrees to proceed with screening colonoscopy and referral was placed to [facility] who saw him in the past for his elevation of liver  enzymes.  - REFERRAL TO GI FOR COLONOSCOPY    7. Need for shingles vaccine  First dose of Shingrix was given.  Discussed potential side effects and how he can manage them.  We will give the second dose when he returns for his follow-up.  - Shingles Vaccine (Shingrix)      Please note that this dictation was created using voice recognition software. I have worked with consultants from the vendor as well as technical experts from [facility] to optimize the interface. I have made every reasonable attempt to correct obvious errors, but I expect that there are errors of grammar and possibly content I did not discover before finalizing the note.

## 2021-06-30 NOTE — LETTER
June 30, 2021         Patient: Nabeel Lopez Jr.   YOB: 1955   Date of Visit: 6/30/2021           To Whom it May Concern:    Nabeel Lopez was seen in my clinic on 6/30/2021. He is excused from work on 6/29/2021.    If you have any questions or concerns, please don't hesitate to call.        Sincerely,           Alejandrina Law M.D.  Electronically Signed

## 2021-10-19 LAB
ALBUMIN SERPL-MCNC: 4.2 G/DL (ref 3.8–4.8)
ALBUMIN/GLOB SERPL: 1.1 {RATIO} (ref 1.2–2.2)
ALP SERPL-CCNC: 122 IU/L (ref 44–121)
ALT SERPL-CCNC: 61 IU/L (ref 0–44)
AST SERPL-CCNC: 48 IU/L (ref 0–40)
BILIRUB SERPL-MCNC: 1.3 MG/DL (ref 0–1.2)
BUN SERPL-MCNC: 17 MG/DL (ref 8–27)
BUN/CREAT SERPL: 16 (ref 10–24)
CALCIUM SERPL-MCNC: 9.2 MG/DL (ref 8.6–10.2)
CHLORIDE SERPL-SCNC: 106 MMOL/L (ref 96–106)
CHOLEST SERPL-MCNC: 146 MG/DL (ref 100–199)
CO2 SERPL-SCNC: 21 MMOL/L (ref 20–29)
CREAT SERPL-MCNC: 1.07 MG/DL (ref 0.76–1.27)
GLOBULIN SER CALC-MCNC: 3.7 G/DL (ref 1.5–4.5)
GLUCOSE SERPL-MCNC: 170 MG/DL (ref 65–99)
HBA1C MFR BLD: 7.2 % (ref 4.8–5.6)
HDLC SERPL-MCNC: 66 MG/DL
LABORATORY COMMENT REPORT: NORMAL
LDLC SERPL CALC-MCNC: 62 MG/DL (ref 0–99)
POTASSIUM SERPL-SCNC: 4.5 MMOL/L (ref 3.5–5.2)
PROT SERPL-MCNC: 7.9 G/DL (ref 6–8.5)
SODIUM SERPL-SCNC: 139 MMOL/L (ref 134–144)
TRIGL SERPL-MCNC: 97 MG/DL (ref 0–149)
VLDLC SERPL CALC-MCNC: 18 MG/DL (ref 5–40)

## 2021-10-25 ENCOUNTER — OFFICE VISIT (OUTPATIENT)
Dept: MEDICAL GROUP | Facility: PHYSICIAN GROUP | Age: 66
End: 2021-10-25
Payer: COMMERCIAL

## 2021-10-25 VITALS
TEMPERATURE: 97.6 F | OXYGEN SATURATION: 96 % | SYSTOLIC BLOOD PRESSURE: 110 MMHG | HEIGHT: 66 IN | BODY MASS INDEX: 25.3 KG/M2 | HEART RATE: 80 BPM | WEIGHT: 157.41 LBS | DIASTOLIC BLOOD PRESSURE: 70 MMHG

## 2021-10-25 DIAGNOSIS — E78.5 DYSLIPIDEMIA: ICD-10-CM

## 2021-10-25 DIAGNOSIS — R91.1 LUNG NODULE: ICD-10-CM

## 2021-10-25 DIAGNOSIS — E11.65 UNCONTROLLED TYPE 2 DIABETES MELLITUS WITH HYPERGLYCEMIA (HCC): ICD-10-CM

## 2021-10-25 DIAGNOSIS — Z23 NEED FOR SHINGLES VACCINE: ICD-10-CM

## 2021-10-25 DIAGNOSIS — Z12.11 SCREEN FOR COLON CANCER: ICD-10-CM

## 2021-10-25 DIAGNOSIS — R74.8 ELEVATED LIVER ENZYMES: ICD-10-CM

## 2021-10-25 DIAGNOSIS — I10 ESSENTIAL HYPERTENSION: ICD-10-CM

## 2021-10-25 DIAGNOSIS — Z23 NEED FOR IMMUNIZATION AGAINST INFLUENZA: ICD-10-CM

## 2021-10-25 PROCEDURE — 99214 OFFICE O/P EST MOD 30 MIN: CPT | Mod: 25 | Performed by: FAMILY MEDICINE

## 2021-10-25 PROCEDURE — 90472 IMMUNIZATION ADMIN EACH ADD: CPT | Performed by: FAMILY MEDICINE

## 2021-10-25 PROCEDURE — 90471 IMMUNIZATION ADMIN: CPT | Performed by: FAMILY MEDICINE

## 2021-10-25 PROCEDURE — 90662 IIV NO PRSV INCREASED AG IM: CPT | Performed by: FAMILY MEDICINE

## 2021-10-25 PROCEDURE — 90750 HZV VACC RECOMBINANT IM: CPT | Performed by: FAMILY MEDICINE

## 2021-10-25 ASSESSMENT — FIBROSIS 4 INDEX: FIB4 SCORE: 2.15

## 2021-10-25 NOTE — PATIENT INSTRUCTIONS

## 2021-10-25 NOTE — PROGRESS NOTES
Blair Lopez Jr. is a 66 y.o. male who presents with Diabetes and Medication Refill (metformin and atorvastatin)            HPI     Patient is here for follow-up of his medical problems.  He is accompanied by his daughter.    In terms of his diabetes mellitus type 2, we were able to get it better controlled with addition of glipizide XL 5 mg daily to his regimen of maximum doses of Metformin and Jardiance.  He is tolerating all medications without side effects.  He did his blood work before this visit.    In terms of the dyslipidemia, he continues to take atorvastatin without myalgias.    Blood pressure is under control on lisinopril.  Denies any side effects.    We continue to follow him for elevated liver enzymes.  This was due to rifampin and INH which he took in 2015 for treatment of pulmonary TB.  He has been worked up by GI specialist for other causes with negative viral hepatitis panel, negative JACIEL and F-actin.  His abdominal ultrasound did not show any dilated bile duct and liver was with normal appearance but limited evaluation of gallbladder and pancreas due to bowel gas.  He denies any GI symptoms.  He drinks alcohol only occasionally.  His liver enzymes have continued to be elevated although lower than when he was on INH and rifampin.  The follow-up blood work in June 2021 came back the AST and ALT were already normal and the alkaline phosphatase was improved and closer to normal.    In 2018, he had CAT scan of the abdomen and pelvis as part of the work-up of the elevated liver enzymes which showed heterogeneous nodular liver suggestive of cirrhosis, no gallstones, bronchiectasis with 4.4 mm left lower lobe nodule.  Patient smoked half a pack per day for 32 years and quit in 1988.  Denies any symptoms of lung cancer.    Past medical history, past surgical history, family history reviewed-no changes    Social history reviewed-no changes    Allergies reviewed-no changes    Medications  "reviewed-no changes    ROS     As per HPI, the rest are negative.           Objective     /70 (BP Location: Left arm, Patient Position: Sitting, BP Cuff Size: Adult)   Pulse 80   Temp 36.4 °C (97.6 °F) (Temporal)   Ht 1.664 m (5' 5.5\")   Wt 71.4 kg (157 lb 6.5 oz)   SpO2 96%   BMI 25.80 kg/m²      Physical Exam     Examined alert, awake, oriented, not in distress    Neck-supple, no lymphadenopathy, no thyromegaly  Lungs-clear to auscultation, no rales, no wheezes  Heart-regular rate and rhythm, no murmur  Extremities-no edema, clubbing, cyanosis             Orders Only on 03/01/2021   Component Date Value Ref Range Status   • WBC 03/01/2021 8.1  3.4 - 10.8 x10E3/uL Final   • RBC 03/01/2021 5.37  4.14 - 5.80 x10E6/uL Final   • Hemoglobin 03/01/2021 17.7  13.0 - 17.7 g/dL Final   • Hematocrit 03/01/2021 51.8* 37.5 - 51.0 % Final   • MCV 03/01/2021 97  79 - 97 fL Final   • MCH 03/01/2021 33.0  26.6 - 33.0 pg Final   • MCHC 03/01/2021 34.2  31.5 - 35.7 g/dL Final   • RDW 03/01/2021 12.2  11.6 - 15.4 % Final   • Platelet Count 03/01/2021 194  150 - 450 x10E3/uL Final   • Neutrophils-Polys 03/01/2021 64  Not Estab. % Final   • Lymphocytes 03/01/2021 26  Not Estab. % Final   • Monocytes 03/01/2021 7  Not Estab. % Final   • Eosinophils 03/01/2021 1  Not Estab. % Final   • Basophils 03/01/2021 1  Not Estab. % Final   • Immature Cells 03/01/2021 CANCELED   Final    Result canceled by the ancillary.   • Neutrophils (Absolute) 03/01/2021 5.3  1.4 - 7.0 x10E3/uL Final   • Lymphs (Absolute) 03/01/2021 2.1  0.7 - 3.1 x10E3/uL Final   • Monos (Absolute) 03/01/2021 0.6  0.1 - 0.9 x10E3/uL Final   • Eos (Absolute) 03/01/2021 0.1  0.0 - 0.4 x10E3/uL Final   • Baso (Absolute) 03/01/2021 0.0  0.0 - 0.2 x10E3/uL Final   • Immature Granulocytes 03/01/2021 1  Not Estab. % Final   • Immature Granulocytes (abs) 03/01/2021 0.0  0.0 - 0.1 x10E3/uL Final   • Nucleated RBC 03/01/2021 CANCELED   Final    Result canceled by the " ancillary.   • Comments-Diff 03/01/2021 CANCELED   Final    Result canceled by the ancillary.   • Glucose 03/01/2021 187* 65 - 99 mg/dL Final   • Bun 03/01/2021 14  8 - 27 mg/dL Final   • Creatinine 03/01/2021 0.83  0.76 - 1.27 mg/dL Final   • GFR If Non  03/01/2021 91  >59 mL/min/1.73 Final   • GFR If  03/01/2021 105  >59 mL/min/1.73 Final   • Bun-Creatinine Ratio 03/01/2021 17  10 - 24 Final   • Sodium 03/01/2021 135  134 - 144 mmol/L Final   • Potassium 03/01/2021 4.1  3.5 - 5.2 mmol/L Final   • Chloride 03/01/2021 98  96 - 106 mmol/L Final   • Co2 03/01/2021 21  20 - 29 mmol/L Final   • Calcium 03/01/2021 9.1  8.6 - 10.2 mg/dL Final   • Total Protein 03/01/2021 8.1  6.0 - 8.5 g/dL Final   • Albumin 03/01/2021 4.1  3.8 - 4.8 g/dL Final   • Globulin 03/01/2021 4.0  1.5 - 4.5 g/dL Final   • A-G Ratio 03/01/2021 1.0* 1.2 - 2.2 Final   • Total Bilirubin 03/01/2021 1.1  0.0 - 1.2 mg/dL Final   • Alkaline Phosphatase 03/01/2021 178* 39 - 117 IU/L Final   • AST(SGOT) 03/01/2021 50* 0 - 40 IU/L Final   • ALT(SGPT) 03/01/2021 70* 0 - 44 IU/L Final   • Cholesterol,Tot 03/01/2021 156  100 - 199 mg/dL Final   • Triglycerides 03/01/2021 101  0 - 149 mg/dL Final   • HDL 03/01/2021 85  >39 mg/dL Final   • VLDL Cholesterol Calc 03/01/2021 18  5 - 40 mg/dL Final   • LDL Chol Calc (NIH) 03/01/2021 53  0 - 99 mg/dL Final   • Comment: 03/01/2021 CANCELED   Final    Result canceled by the ancillary.   • Creatinine, Random Urine 03/01/2021 55.0  Not Estab. mg/dL Final   • Microalbumin, Urine Random 03/01/2021 374.8  Not Estab. ug/mL Final   • Albumin / Creatinine Ratio 03/01/2021 681* 0 - 29 mg/g creat Final    Comment: Normal:                0 -  29  Moderately increased: 30 - 300  Severely increased:       >300     • Glycohemoglobin 03/01/2021 10.4* 4.8 - 5.6 % Final    Comment: Prediabetes: 5.7 - 6.4  Diabetes: >6.4  Glycemic control for adults with diabetes: <7.0     • Glucose 06/28/2021 142* 65 -  99 mg/dL Final   • Bun 06/28/2021 14  8 - 27 mg/dL Final   • Creatinine 06/28/2021 1.00  0.76 - 1.27 mg/dL Final   • GFR If Non  06/28/2021 78  >59 mL/min/1.73 Final   • GFR If  06/28/2021 90  >59 mL/min/1.73 Final    Comment: **[facility] currently reports eGFR in compliance with the current**  recommendations of the National Kidney Foundation. [facility] will  update reporting as new guidelines are published from the NKF-ASN  Task force.     • Bun-Creatinine Ratio 06/28/2021 14  10 - 24 Final   • Sodium 06/28/2021 141  134 - 144 mmol/L Final   • Potassium 06/28/2021 4.3  3.5 - 5.2 mmol/L Final   • Chloride 06/28/2021 105  96 - 106 mmol/L Final   • Co2 06/28/2021 19* 20 - 29 mmol/L Final   • Calcium 06/28/2021 9.3  8.6 - 10.2 mg/dL Final   • Total Protein 06/28/2021 8.0  6.0 - 8.5 g/dL Final   • Albumin 06/28/2021 4.4  3.8 - 4.8 g/dL Final   • Globulin 06/28/2021 3.6  1.5 - 4.5 g/dL Final   • A-G Ratio 06/28/2021 1.2  1.2 - 2.2 Final   • Total Bilirubin 06/28/2021 1.7* 0.0 - 1.2 mg/dL Final   • Alkaline Phosphatase 06/28/2021 125* 48 - 121 IU/L Final   • AST(SGOT) 06/28/2021 35  0 - 40 IU/L Final   • ALT(SGPT) 06/28/2021 42  0 - 44 IU/L Final   • Cholesterol,Tot 06/28/2021 140  100 - 199 mg/dL Final   • Triglycerides 06/28/2021 74  0 - 149 mg/dL Final   • HDL 06/28/2021 69  >39 mg/dL Final   • VLDL Cholesterol Calc 06/28/2021 15  5 - 40 mg/dL Final   • LDL Chol Calc (NIH) 06/28/2021 56  0 - 99 mg/dL Final   • Comment: 06/28/2021 CANCELED   Final    Result canceled by the ancillary.   • Glycohemoglobin 06/28/2021 7.1* 4.8 - 5.6 % Final    Comment: Prediabetes: 5.7 - 6.4  Diabetes: >6.4  Glycemic control for adults with diabetes: <7.0     • Prostatic Specific Antigen Tot 06/28/2021 0.7  0.0 - 4.0 ng/mL Final    Comment: Roche ECLIA methodology.  According to the American Urological Association, Serum PSA should  decrease and remain at undetectable levels after radical  prostatectomy. The AUA  defines biochemical recurrence as an initial  PSA value 0.2 ng/mL or greater followed by a subsequent confirmatory  PSA value 0.2 ng/mL or greater.  Values obtained with different assay methods or kits cannot be used  interchangeably. Results cannot be interpreted as absolute evidence  of the presence or absence of malignant disease.     • Glucose 10/18/2021 170* 65 - 99 mg/dL Final   • Bun 10/18/2021 17  8 - 27 mg/dL Final   • Creatinine 10/18/2021 1.07  0.76 - 1.27 mg/dL Final   • GFR If Non  10/18/2021 71  >59 mL/min/1.73 Final   • GFR If  10/18/2021 82  >59 mL/min/1.73 Final    Comment: **In accordance with recommendations from the NKF-ASN Task force,**  [facility] is in the process of updating its eGFR calculation to the  2021 CKD-EPI creatinine equation that estimates kidney function  without a race variable.     • Bun-Creatinine Ratio 10/18/2021 16  10 - 24 Final   • Sodium 10/18/2021 139  134 - 144 mmol/L Final   • Potassium 10/18/2021 4.5  3.5 - 5.2 mmol/L Final   • Chloride 10/18/2021 106  96 - 106 mmol/L Final   • Co2 10/18/2021 21  20 - 29 mmol/L Final   • Calcium 10/18/2021 9.2  8.6 - 10.2 mg/dL Final   • Total Protein 10/18/2021 7.9  6.0 - 8.5 g/dL Final   • Albumin 10/18/2021 4.2  3.8 - 4.8 g/dL Final   • Globulin 10/18/2021 3.7  1.5 - 4.5 g/dL Final   • A-G Ratio 10/18/2021 1.1* 1.2 - 2.2 Final   • Total Bilirubin 10/18/2021 1.3* 0.0 - 1.2 mg/dL Final   • Alkaline Phosphatase 10/18/2021 122* 44 - 121 IU/L Final    **Please note reference interval change**   • AST(SGOT) 10/18/2021 48* 0 - 40 IU/L Final   • ALT(SGPT) 10/18/2021 61* 0 - 44 IU/L Final   • Cholesterol,Tot 10/18/2021 146  100 - 199 mg/dL Final   • Triglycerides 10/18/2021 97  0 - 149 mg/dL Final   • HDL 10/18/2021 66  >39 mg/dL Final   • VLDL Cholesterol Calc 10/18/2021 18  5 - 40 mg/dL Final   • LDL Chol Calc ([facility]) 10/18/2021 62  0 - 99 mg/dL Final   • Comment: 10/18/2021 CANCELED   Final    Result canceled by  the ancillary.   • Glycohemoglobin 10/18/2021 7.2* 4.8 - 5.6 % Final    Comment: Prediabetes: 5.7 - 6.4  Diabetes: >6.4  Glycemic control for adults with diabetes: <7.0                    Assessment & Plan        1. Uncontrolled type 2 diabetes mellitus with hyperglycemia (HCC)  Hemoglobin A1c increased slightly from 7.1-7.2.  Our goal is for this to be below 7.0.  Advised to continue to work on watching the diet with avoidance of sweets and decreasing the carbs.  He was given a diet sheet to follow.  Continue current meds.  If hemoglobin A1c will continue to increase especially if it goes up to 8.0 or higher we will increase the glipizide dose next visit.  For now continue all current medications.  Recheck blood work in 4 months.  - Lipid Profile; Future  - Comp Metabolic Panel; Future  - HEMOGLOBIN A1C; Future  - CBC WITH DIFFERENTIAL; Future  - MICROALBUMIN CREAT RATIO URINE; Future  - MITOCHONDRIAL (M2) AB; Future  - ALKALINE PHOSPHATASE ISOENZYMES; Future    2. Dyslipidemia  All at target on cholesterol medication.  - Lipid Profile; Future  - Comp Metabolic Panel; Future  - HEMOGLOBIN A1C; Future  - CBC WITH DIFFERENTIAL; Future  - MICROALBUMIN CREAT RATIO URINE; Future  - MITOCHONDRIAL (M2) AB; Future  - ALKALINE PHOSPHATASE ISOENZYMES; Future    3. Essential hypertension is ready  Controlled.  Continue lisinopril.  - Lipid Profile; Future  - Comp Metabolic Panel; Future  - HEMOGLOBIN A1C; Future  - CBC WITH DIFFERENTIAL; Future  - MICROALBUMIN CREAT RATIO URINE; Future  - MITOCHONDRIAL (M2) AB; Future  - ALKALINE PHOSPHATASE ISOENZYMES; Future    4. Elevated liver enzymes  Liver enzymes AST and ALT are again elevated.  Alkaline phosphatase continues to go down.  I will check antimitochondrial antibody and alkaline phosphatase fractionation.  Consider sending back to GI specialist for follow-up especially because the CAT scan of the abdomen in 2018 showed findings consistent with liver cirrhosis.  - Lipid  Profile; Future  - Comp Metabolic Panel; Future  - HEMOGLOBIN A1C; Future  - CBC WITH DIFFERENTIAL; Future  - MICROALBUMIN CREAT RATIO URINE; Future  - MITOCHONDRIAL (M2) AB; Future  - ALKALINE PHOSPHATASE ISOENZYMES; Future    5. Lung nodule  Because of history of smoking patient is considered high risk so we need to do follow-up of the lung nodule with a CT of the chest without contrast.  - CT-CHEST (THORAX) W/O; Future  - Lipid Profile; Future  - Comp Metabolic Panel; Future  - HEMOGLOBIN A1C; Future  - CBC WITH DIFFERENTIAL; Future  - MICROALBUMIN CREAT RATIO URINE; Future  - MITOCHONDRIAL (M2) AB; Future  - ALKALINE PHOSPHATASE ISOENZYMES; Future    6. Need for immunization against influenza  Flu shot was given.  - INFLUENZA VACCINE, HIGH DOSE (65+ ONLY)    7. Need for shingles vaccine  He is due for second dose of shingles vaccine and this was given.  Made aware of potential side effects and how he can manage them.  - Shingles Vaccine (Shingrix)    8. Screen for colon cancer  He has been referred for colonoscopy but he decided he does not want to proceed with this at this time.  He does not want to take time off work.  He wants to do the FIT and he was given the kit.  - OCCULT BLOOD FECES IMMUNOASSAY; Future      Follow-up in 4 months.      Please note that this dictation was created using voice recognition software. I have worked with consultants from the vendor as well as technical experts from [facility]  [employer] to optimize the interface. I have made every reasonable attempt to correct obvious errors, but I expect that there are errors of grammar and possibly content I did not discover before finalizing the note.

## 2021-11-02 DIAGNOSIS — E11.65 UNCONTROLLED TYPE 2 DIABETES MELLITUS WITH HYPERGLYCEMIA (HCC): ICD-10-CM

## 2021-11-02 DIAGNOSIS — E78.5 DYSLIPIDEMIA: ICD-10-CM

## 2021-11-02 RX ORDER — ATORVASTATIN CALCIUM 20 MG/1
20 TABLET, FILM COATED ORAL DAILY
Qty: 90 TABLET | Refills: 1 | Status: SHIPPED | OUTPATIENT
Start: 2021-11-02 | End: 2022-05-12 | Stop reason: SDUPTHER

## 2021-11-10 ENCOUNTER — HOSPITAL ENCOUNTER (OUTPATIENT)
Dept: RADIOLOGY | Facility: MEDICAL CENTER | Age: 66
End: 2021-11-10
Attending: FAMILY MEDICINE
Payer: COMMERCIAL

## 2021-11-10 DIAGNOSIS — R91.1 PULMONARY NODULE: ICD-10-CM

## 2021-11-10 DIAGNOSIS — R91.8 ABNORMAL CT SCAN, LUNG: ICD-10-CM

## 2021-11-10 DIAGNOSIS — R91.1 LUNG NODULE: ICD-10-CM

## 2021-11-10 PROCEDURE — 71250 CT THORAX DX C-: CPT

## 2021-12-13 DIAGNOSIS — J20.9 ACUTE BRONCHITIS, UNSPECIFIED ORGANISM: ICD-10-CM

## 2021-12-13 RX ORDER — AZITHROMYCIN 250 MG/1
TABLET, FILM COATED ORAL
Qty: 6 TABLET | Refills: 0 | OUTPATIENT
Start: 2021-12-13

## 2021-12-20 ENCOUNTER — HOSPITAL ENCOUNTER (OUTPATIENT)
Facility: MEDICAL CENTER | Age: 66
End: 2021-12-20
Attending: FAMILY MEDICINE
Payer: COMMERCIAL

## 2021-12-20 PROCEDURE — 82274 ASSAY TEST FOR BLOOD FECAL: CPT

## 2021-12-28 DIAGNOSIS — Z12.11 SCREEN FOR COLON CANCER: ICD-10-CM

## 2021-12-29 LAB — IMM ASSAY OCC BLD FITOB: NEGATIVE

## 2022-01-09 DIAGNOSIS — I10 ESSENTIAL HYPERTENSION: ICD-10-CM

## 2022-01-09 RX ORDER — LISINOPRIL 10 MG/1
10 TABLET ORAL DAILY
Qty: 90 TABLET | Refills: 1 | Status: SHIPPED | OUTPATIENT
Start: 2022-01-09 | End: 2023-02-24 | Stop reason: SDUPTHER

## 2022-02-18 LAB
ALBUMIN SERPL-MCNC: 4.3 G/DL (ref 3.8–4.8)
ALBUMIN/CREAT UR: 503 MG/G CREAT (ref 0–29)
ALBUMIN/GLOB SERPL: 1.2 {RATIO} (ref 1.2–2.2)
ALP BONE CFR SERPL: 23 % (ref 12–68)
ALP INTEST CFR SERPL: 3 % (ref 0–18)
ALP LIVER CFR SERPL: 74 % (ref 13–88)
ALP SERPL-CCNC: 104 IU/L (ref 44–121)
ALT SERPL-CCNC: 51 IU/L (ref 0–44)
AST SERPL-CCNC: 41 IU/L (ref 0–40)
BASOPHILS # BLD AUTO: 0 X10E3/UL (ref 0–0.2)
BASOPHILS NFR BLD AUTO: 1 %
BILIRUB SERPL-MCNC: 1.5 MG/DL (ref 0–1.2)
BUN SERPL-MCNC: 13 MG/DL (ref 8–27)
BUN/CREAT SERPL: 15 (ref 10–24)
CALCIUM SERPL-MCNC: 9.3 MG/DL (ref 8.6–10.2)
CHLORIDE SERPL-SCNC: 102 MMOL/L (ref 96–106)
CHOLEST SERPL-MCNC: 163 MG/DL (ref 100–199)
CO2 SERPL-SCNC: 22 MMOL/L (ref 20–29)
CREAT SERPL-MCNC: 0.89 MG/DL (ref 0.76–1.27)
CREAT UR-MCNC: 71.6 MG/DL
EOSINOPHIL # BLD AUTO: 0.1 X10E3/UL (ref 0–0.4)
EOSINOPHIL NFR BLD AUTO: 2 %
ERYTHROCYTE [DISTWIDTH] IN BLOOD BY AUTOMATED COUNT: 12.2 % (ref 11.6–15.4)
GLOBULIN SER CALC-MCNC: 3.7 G/DL (ref 1.5–4.5)
GLUCOSE SERPL-MCNC: 155 MG/DL (ref 65–99)
HBA1C MFR BLD: 6.9 % (ref 4.8–5.6)
HCT VFR BLD AUTO: 53.4 % (ref 37.5–51)
HDLC SERPL-MCNC: 59 MG/DL
HGB BLD-MCNC: 18.5 G/DL (ref 13–17.7)
IMM GRANULOCYTES # BLD AUTO: 0 X10E3/UL (ref 0–0.1)
IMM GRANULOCYTES NFR BLD AUTO: 0 %
IMMATURE CELLS  115398: ABNORMAL
LABORATORY COMMENT REPORT: NORMAL
LDLC SERPL CALC-MCNC: 81 MG/DL (ref 0–99)
LYMPHOCYTES # BLD AUTO: 1.6 X10E3/UL (ref 0.7–3.1)
LYMPHOCYTES NFR BLD AUTO: 25 %
MCH RBC QN AUTO: 33.6 PG (ref 26.6–33)
MCHC RBC AUTO-ENTMCNC: 34.6 G/DL (ref 31.5–35.7)
MCV RBC AUTO: 97 FL (ref 79–97)
MICROALBUMIN UR-MCNC: 360.5 UG/ML
MITOCHONDRIA M2 IGG SER-ACNC: <20 UNITS (ref 0–20)
MONOCYTES # BLD AUTO: 0.4 X10E3/UL (ref 0.1–0.9)
MONOCYTES NFR BLD AUTO: 6 %
MORPHOLOGY BLD-IMP: ABNORMAL
NEUTROPHILS # BLD AUTO: 4.2 X10E3/UL (ref 1.4–7)
NEUTROPHILS NFR BLD AUTO: 66 %
NRBC BLD AUTO-RTO: ABNORMAL %
PLATELET # BLD AUTO: 167 X10E3/UL (ref 150–450)
POTASSIUM SERPL-SCNC: 4.3 MMOL/L (ref 3.5–5.2)
PROT SERPL-MCNC: 8 G/DL (ref 6–8.5)
RBC # BLD AUTO: 5.5 X10E6/UL (ref 4.14–5.8)
SODIUM SERPL-SCNC: 138 MMOL/L (ref 134–144)
TRIGL SERPL-MCNC: 129 MG/DL (ref 0–149)
VLDLC SERPL CALC-MCNC: 23 MG/DL (ref 5–40)
WBC # BLD AUTO: 6.3 X10E3/UL (ref 3.4–10.8)

## 2022-03-07 ENCOUNTER — OFFICE VISIT (OUTPATIENT)
Dept: MEDICAL GROUP | Facility: PHYSICIAN GROUP | Age: 67
End: 2022-03-07
Payer: COMMERCIAL

## 2022-03-07 VITALS
BODY MASS INDEX: 25.3 KG/M2 | SYSTOLIC BLOOD PRESSURE: 120 MMHG | TEMPERATURE: 96.7 F | OXYGEN SATURATION: 96 % | HEART RATE: 81 BPM | WEIGHT: 157.41 LBS | DIASTOLIC BLOOD PRESSURE: 70 MMHG | HEIGHT: 66 IN

## 2022-03-07 DIAGNOSIS — I10 ESSENTIAL HYPERTENSION: ICD-10-CM

## 2022-03-07 DIAGNOSIS — R74.8 ELEVATED LIVER ENZYMES: ICD-10-CM

## 2022-03-07 DIAGNOSIS — R91.1 LUNG NODULE: ICD-10-CM

## 2022-03-07 DIAGNOSIS — R80.9 TYPE 2 DIABETES MELLITUS WITH MICROALBUMINURIA, WITHOUT LONG-TERM CURRENT USE OF INSULIN (HCC): ICD-10-CM

## 2022-03-07 DIAGNOSIS — E11.29 TYPE 2 DIABETES MELLITUS WITH MICROALBUMINURIA, WITHOUT LONG-TERM CURRENT USE OF INSULIN (HCC): ICD-10-CM

## 2022-03-07 DIAGNOSIS — D75.1 POLYCYTHEMIA: ICD-10-CM

## 2022-03-07 DIAGNOSIS — R80.9 MICROALBUMINURIA: ICD-10-CM

## 2022-03-07 DIAGNOSIS — Z12.5 SCREENING FOR PROSTATE CANCER: ICD-10-CM

## 2022-03-07 DIAGNOSIS — E78.5 DYSLIPIDEMIA: ICD-10-CM

## 2022-03-07 PROCEDURE — 99214 OFFICE O/P EST MOD 30 MIN: CPT | Performed by: FAMILY MEDICINE

## 2022-03-07 RX ORDER — GLIPIZIDE 5 MG/1
5 TABLET, FILM COATED, EXTENDED RELEASE ORAL DAILY
Qty: 90 TABLET | Refills: 1 | Status: SHIPPED | OUTPATIENT
Start: 2022-03-07 | End: 2022-05-12 | Stop reason: SDUPTHER

## 2022-03-07 RX ORDER — EMPAGLIFLOZIN 25 MG/1
1 TABLET, FILM COATED ORAL DAILY
Qty: 90 TABLET | Refills: 1 | Status: SHIPPED | OUTPATIENT
Start: 2022-03-07 | End: 2022-11-02 | Stop reason: SDUPTHER

## 2022-03-07 ASSESSMENT — PATIENT HEALTH QUESTIONNAIRE - PHQ9: CLINICAL INTERPRETATION OF PHQ2 SCORE: 0

## 2022-03-07 ASSESSMENT — FIBROSIS 4 INDEX: FIB4 SCORE: 2.34

## 2022-03-07 NOTE — PROGRESS NOTES
Blair Lopez Jr. is a 66 y.o. male who presents with Diabetes            HPI     Patient returns for follow-up of his medical problems.    In terms of diabetes mellitus type 2 with microalbuminuria, his hemoglobin A1c increased from 7.1-7.2 back in October 2021.  We kept him on the same doses of Metformin 1000 mg twice daily, Jardiance 25 mg daily and glipizide SR 5 mg daily.  He has been working hard on getting diabetes better controlled.  Blood work was done before this visit.    For his microalbuminuria, patient is already on lisinopril.    In terms of dyslipidemia, he continues to take atorvastatin without myalgias.    In terms of hypertension, this is under control on lisinopril without side effects.    He had incidental finding of 4.4 mm left lower lobe nodule and bronchiectasis on CT scanning of the abdomen and pelvis as part of the work-up of elevated liver enzymes in 2018.  Patient smoked half a pack per day for 32 years and quit in 1988.  I did an updated CT of the chest in November 2021 showed the nodule increased from 4 mm to 6 mm, patchy peribronchial nodular densities and bronchiectasis involving the right middle, upper and right apex and lingula likely on the basis of chronic infection.  I referred him to the pulmonary specialist and he has an appointment on 3/14/2021.  He said he has chronic cough with yellowish phlegm in the last 3 years but no shortness of breath.  Denies any fever, chills, night sweats, weight loss.  Patient with history of pulmonary tuberculosis for which he finished treatment in 2015.    We have been following him for elevated liver enzymes.  These were found around the time when he was taking rifampin and INH for the treatment of TB in 2015.  Work-up done by GI specialist came back negative viral hepatitis panel, negative JACIEL and F-actin.  Abdominal ultrasound did not show any dilated bile duct and liver was normal appearance.  He has no GI symptoms.  He only  "consumes alcohol occasionally.  The liver enzymes stayed elevated even if he has been off the INH and rifampin for years.  The levels have been lower than he was still on the medications.  The CT scan of the chest done in November 2021 showed findings consistent with cirrhosis of the liver.    Past medical history, past surgical history, family history reviewed-no changes    Social history reviewed-no changes    Allergies reviewed-no changes    Medications reviewed-no changes        ROS     As per HPI, the rest are negative.         Objective     /70 (BP Location: Left arm, Patient Position: Sitting, BP Cuff Size: Adult)   Pulse 81   Temp 35.9 °C (96.7 °F) (Temporal)   Ht 1.664 m (5' 5.5\")   Wt 71.4 kg (157 lb 6.5 oz)   SpO2 96%   BMI 25.80 kg/m²      Physical Exam     Examined alert, awake, oriented, not in distress    Neck-supple, no lymphadenopathy, no thyromegaly  Lungs-clear to auscultation, no rales, no wheezes  Heart-regular rate and rhythm, no murmur  Extremities-no edema, clubbing, cyanosis             Orders Only on 02/15/2022   Component Date Value Ref Range Status   • WBC 02/15/2022 6.3  3.4 - 10.8 x10E3/uL Final   • RBC 02/15/2022 5.50  4.14 - 5.80 x10E6/uL Final   • Hemoglobin 02/15/2022 18.5 (A) 13.0 - 17.7 g/dL Final   • Hematocrit 02/15/2022 53.4 (A) 37.5 - 51.0 % Final   • MCV 02/15/2022 97  79 - 97 fL Final   • MCH 02/15/2022 33.6 (A) 26.6 - 33.0 pg Final   • MCHC 02/15/2022 34.6  31.5 - 35.7 g/dL Final   • RDW 02/15/2022 12.2  11.6 - 15.4 % Final   • Platelet Count 02/15/2022 167  150 - 450 x10E3/uL Final   • Neutrophils-Polys 02/15/2022 66  Not Estab. % Final   • Lymphocytes 02/15/2022 25  Not Estab. % Final   • Monocytes 02/15/2022 6  Not Estab. % Final   • Eosinophils 02/15/2022 2  Not Estab. % Final   • Basophils 02/15/2022 1  Not Estab. % Final   • Immature Cells 02/15/2022 CANCELED   Final    Result canceled by the ancillary.   • Neutrophils (Absolute) 02/15/2022 4.2  1.4 - 7.0 " x10E3/uL Final   • Lymphs (Absolute) 02/15/2022 1.6  0.7 - 3.1 x10E3/uL Final   • Monos (Absolute) 02/15/2022 0.4  0.1 - 0.9 x10E3/uL Final   • Eos (Absolute) 02/15/2022 0.1  0.0 - 0.4 x10E3/uL Final   • Baso (Absolute) 02/15/2022 0.0  0.0 - 0.2 x10E3/uL Final   • Immature Granulocytes 02/15/2022 0  Not Estab. % Final   • Immature Granulocytes (abs) 02/15/2022 0.0  0.0 - 0.1 x10E3/uL Final   • Nucleated RBC 02/15/2022 CANCELED   Final    Result canceled by the ancillary.   • Comments-Diff 02/15/2022 CANCELED   Final    Result canceled by the ancillary.   • Glucose 02/15/2022 155 (A) 65 - 99 mg/dL Final   • Bun 02/15/2022 13  8 - 27 mg/dL Final   • Creatinine 02/15/2022 0.89  0.76 - 1.27 mg/dL Final   • GFR If Non  02/15/2022 88  >59 mL/min/1.73 Final   • GFR If  02/15/2022 102  >59 mL/min/1.73 Final    Comment: **In accordance with recommendations from the NKF-ASN Task force,**  [facility] is in the process of updating its eGFR calculation to the  2021 CKD-EPI creatinine equation that estimates kidney function  without a race variable.     • Bun-Creatinine Ratio 02/15/2022 15  10 - 24 Final   • Sodium 02/15/2022 138  134 - 144 mmol/L Final   • Potassium 02/15/2022 4.3  3.5 - 5.2 mmol/L Final   • Chloride 02/15/2022 102  96 - 106 mmol/L Final   • Co2 02/15/2022 22  20 - 29 mmol/L Final   • Calcium 02/15/2022 9.3  8.6 - 10.2 mg/dL Final   • Total Protein 02/15/2022 8.0  6.0 - 8.5 g/dL Final   • Albumin 02/15/2022 4.3  3.8 - 4.8 g/dL Final   • Globulin 02/15/2022 3.7  1.5 - 4.5 g/dL Final   • A-G Ratio 02/15/2022 1.2  1.2 - 2.2 Final   • Total Bilirubin 02/15/2022 1.5 (A) 0.0 - 1.2 mg/dL Final   • Alkaline Phosphatase 02/15/2022 104  44 - 121 IU/L Final   • AST(SGOT) 02/15/2022 41 (A) 0 - 40 IU/L Final   • ALT(SGPT) 02/15/2022 51 (A) 0 - 44 IU/L Final   • Cholesterol,Tot 02/15/2022 163  100 - 199 mg/dL Final   • Triglycerides 02/15/2022 129  0 - 149 mg/dL Final   • HDL 02/15/2022 59  >39  mg/dL Final   • VLDL Cholesterol Calc 02/15/2022 23  5 - 40 mg/dL Final   • LDL Chol Calc (NIH) 02/15/2022 81  0 - 99 mg/dL Final   • Comment: 02/15/2022 CANCELED   Final    Result canceled by the ancillary.   • Liver Fractions 02/15/2022 74  13 - 88 % Final   • Bone Fractions 02/15/2022 23  12 - 68 % Final   • Intestinal Fractions 02/15/2022 3  0 - 18 % Final   • Creatinine, Random Urine 02/15/2022 71.6  Not Estab. mg/dL Final   • Microalbumin, Urine Random 02/15/2022 360.5  Not Estab. ug/mL Final   • Albumin / Creatinine Ratio 02/15/2022 503 (A) 0 - 29 mg/g creat Final    Comment: Normal:                0 -  29  Moderately increased: 30 - 300  Severely increased:       >300     • Glycohemoglobin 02/15/2022 6.9 (A) 4.8 - 5.6 % Final    Comment: Prediabetes: 5.7 - 6.4  Diabetes: >6.4  Glycemic control for adults with diabetes: <7.0     • Anti-Mitochondrial Ab 02/15/2022 <20.0  0.0 - 20.0 Units Final    Comment: Negative    0.0 - 20.0  Equivocal  20.1 - 24.9  Positive         >24.9  Mitochondrial (M2) Antibodies are found in 90-96% of  patients with primary biliary cirrhosis.                    Assessment & Plan        1. Type 2 diabetes mellitus with microalbuminuria, without long-term current use of insulin (HCC)  This is improved and now under control with hemoglobin A1c down to 6.9.  Continue current medications and current doses.  - Lipid Profile; Future  - Comp Metabolic Panel; Future  - HEMOGLOBIN A1C; Future  - CBC WITH DIFFERENTIAL; Future  - glipiZIDE SR (GLUCOTROL) 5 MG TABLET SR 24 HR; Take 1 Tablet by mouth every day.  Dispense: 90 Tablet; Refill: 1  - Empagliflozin (JARDIANCE) 25 MG Tab; Take 1 Tablet by mouth every day at 6 PM.  Dispense: 90 Tablet; Refill: 1    2. Microalbuminuria  He still has microalbuminuria but better than before.  Continue lisinopril.  - Lipid Profile; Future  - Comp Metabolic Panel; Future  - HEMOGLOBIN A1C; Future  - CBC WITH DIFFERENTIAL; Future    3. Dyslipidemia  All at  target on atorvastatin.  Continue medication.  - Lipid Profile; Future  - Comp Metabolic Panel; Future  - HEMOGLOBIN A1C; Future  - CBC WITH DIFFERENTIAL; Future    4. Essential hypertension  Controlled on lisinopril.  - Lipid Profile; Future  - Comp Metabolic Panel; Future  - HEMOGLOBIN A1C; Future  - CBC WITH DIFFERENTIAL; Future    5. Lung nodule  This has increased in size with the most recent CAT scan done last November 2021.  I have referred him to the pulmonary specialist and he has an appointment next week.  There are also findings on the CAT scan consistent with bronchiectasis most likely from chronic infection from prior pulmonary tuberculosis.    6. Elevated liver enzymes  His liver enzymes improved but still elevated.  He was diagnosed to have elevated liver enzymes caused by TB drugs which were rifampin and INH back in 2015.  His liver enzymes however stay elevated although lower than when she was on these medications.  His CAT scan of the lungs done in November 2021 to follow-up on the lung nodule showed findings consistent with liver cirrhosis.  I will refer him back to the gastroenterologist to get this further evaluated.  - Referral to Gastroenterology    7. Polycythemia  He has elevated hemoglobin hematocrit.  We have not seen levels this high before.  I will repeat CBC next visit and I will include erythropoietin to work him up for polycythemia vera.  The elevation of the H&H could be due to his lung disease.  - ERYTHROPOIETIN; Future    8. Screening for prostate cancer  We will do screening PSA with the next blood work.  - PROSTATE SPECIFIC AG SCREENING; Future    Follow-up in 4 months.      Please note that this dictation was created using voice recognition software. I have worked with consultants from the vendor as well as technical experts from [facility]  [employer] to optimize the interface. I have made every reasonable attempt to correct obvious errors, but I expect that there are errors of grammar  and possibly content I did not discover before finalizing the note.

## 2022-03-14 ENCOUNTER — OFFICE VISIT (OUTPATIENT)
Dept: SLEEP MEDICINE | Facility: MEDICAL CENTER | Age: 67
End: 2022-03-14
Payer: COMMERCIAL

## 2022-03-14 VITALS
BODY MASS INDEX: 25.3 KG/M2 | WEIGHT: 157.44 LBS | HEIGHT: 66 IN | HEART RATE: 91 BPM | DIASTOLIC BLOOD PRESSURE: 88 MMHG | SYSTOLIC BLOOD PRESSURE: 134 MMHG | OXYGEN SATURATION: 96 %

## 2022-03-14 DIAGNOSIS — R05.8 COUGH PRODUCTIVE OF YELLOW SPUTUM: ICD-10-CM

## 2022-03-14 DIAGNOSIS — R91.8 ABNORMAL CT SCAN OF LUNG: ICD-10-CM

## 2022-03-14 DIAGNOSIS — Z86.11 HISTORY OF TB (TUBERCULOSIS): ICD-10-CM

## 2022-03-14 DIAGNOSIS — J47.9 BRONCHIECTASIS WITHOUT COMPLICATION (HCC): ICD-10-CM

## 2022-03-14 DIAGNOSIS — R91.8 PULMONARY NODULES: ICD-10-CM

## 2022-03-14 PROCEDURE — 99204 OFFICE O/P NEW MOD 45 MIN: CPT | Performed by: INTERNAL MEDICINE

## 2022-03-14 ASSESSMENT — FIBROSIS 4 INDEX: FIB4 SCORE: 2.34

## 2022-03-14 NOTE — PATIENT INSTRUCTIONS
Thanks for coming to the office today.  Please bring all of your medication bottles to the next office visit, specially inhalers.  If requested, please obtain the prior records from your lung doctor.  If you have been prescribed inhalers, please use them as indicated and please rinse your mouth after using them each time.  Call if any questions!                    Return To Clinic:  2 months. Please do not forget to come at least 20 minutes prior to your appointment.  It has been a pleasure seeing you today.    Сергей Ovalles MD  Pulmonary/Critical Care

## 2022-03-14 NOTE — PROGRESS NOTES
"Pulmonary Clinic Office Note    Date of Visit: 3/14/2022 PCP: Alejandrina Law M.D.    Reason for visit: \"abnormal CT chest\"    Chart reviewed prior to patient interview.    Nabeel Lopez Jr. is a 66 y.o. year old male, with a PMHx of TB s/p treated 2015, treated with [facility]H for 1 year  who presented to the Pulmonary Clinic for a new referral for abnormal CT chest      Background:  He repors had TB in 2015 as above. He tells me that he was told his cultures were negative at the end of the treatment. He was manage by the [facility].    Today:  No SOB, no CP, but does reports a mild baseline cough with phlegm yellow green, present in the mornings at waking up. No fever, no chills, no weight loss.  No diaphoresis, no other B symptom.  He is a  with [employer], has to use PPE, gown, mask, hair covers and exposed to copper.  No GERD symptoms  No history of respiratory failure requiring mechanical ventilation  No history of hemoptysis.  No Personal history of non-resolving or recurrent pneumonia  No Personal history of DVT or pulmonary embolism  Personal history of recurrent sinusitis but no epistaxis/purulent discharge  No reported NSAID precipitated cough, wheeze or sinus congestion    No cold air intolerance  No exercise induced cough wheeze  No family hx of atopy and or asthma  No history of nasal polyps  No hx of recent COVID-19 infection    Pulmonary History:  Inhaler Regimen before this clinic visit: none  Tobacco use:  He quit in 1999, he started smoking at age 24, 10 cigarrettes/day. He denies any e-cigarrettes, no MJ, no other inhalant.  Occupational exposure: He is a  with [employer], has to use PPE, gown, mask, hair covers. He is originally from the [facility]. He is expose to copper but is compliant on wearing PPE.  Pet(s) exposure: no  TB exposure: none, but personal hx as above, also his son was treated years ago. No active infections in family or friends.    MMRC Dyspnea " Scale  Grade  Description of Breathlessness   0  I only get breathless with strenuous exercise.   1  I get short of breath when hurrying on level ground or walking up a slight hill.   2  On level ground, I walk slower than people of the same age because of breathlessness, or have to stop for breath when walking at my own pace.   3  I stop for breath after walking about 100 yards or after a few minutes on level ground.   4  I am too breathless to leave the house or I am breathless when dressing.     Influenza Vaccine:  yes  COVID vaccine: yes, 3 doses pfizer  Pneumovax:  No    ----------------------------------------------------------------------------------------------------------------------------------------------------------------------------------------------------------------------------------------------------------------  Past Medical History:   Diagnosis Date   • Diabetes (HCC)    • Dyslipidemia    • History of TB (tuberculosis)    • Hypertension      No Known Allergies  Family History   Problem Relation Age of Onset   • Cancer Brother         lung cancer   • Hypertension Mother    • Other Father         liver cirrhosis   • Psychiatric Illness Brother    • Other Brother         pulmonary TB   • Stroke Brother      Past Surgical History:   Procedure Laterality Date   • CATARACT EXTRACTION WITH IOL Bilateral      Social History     Tobacco Use   • Smoking status: Former Smoker     Packs/day: 0.50     Years: 32.00     Pack years: 16.00     Types: Cigarettes     Quit date: 1988     Years since quittin.5   • Smokeless tobacco: Never Used   Vaping Use   • Vaping Use: Never used   Substance Use Topics   • Alcohol use: Yes     Comment: 1/month    • Drug use: No         Review of Systems (Positive in Bold, otherwise negative)    Constitutional: fever, chills, night sweats, weightloss  HEENT: headaches, migraines, vision changes, blurred vision, dry eyes,  changes in hearing, rhinorrhea, sinus congestion,  "dysphagia  Hoarseness of voice, choking  CV: chest pain, BROWN, orthopnea, edema, palpitations  Resp: SOB, cough, hemoptysis, asthma, repeated infections, sputum production, wheezing  GI: changes in appetite, nausea, vomiting, diarrhea, constipation, pain, GERD  : Dysuria, hematuria, nocturia  Lymph: swollen glands  MSK: Muscle weakness, wasting, arthralgia, myalgia  Neuro/Psych: Sensory disturbances, seizures, syncope, anxiety, depression    Objective:  Vitals: /88 (BP Location: Right arm, Patient Position: Sitting, BP Cuff Size: Adult)   Pulse 91   Ht 1.664 m (5' 5.5\")   Wt 71.4 kg (157 lb 7 oz)   SpO2 96%   BMI 25.80 kg/m²     Wt Readings from Last 3 Encounters:   03/14/22 71.4 kg (157 lb 7 oz)   03/07/22 71.4 kg (157 lb 6.5 oz)   10/25/21 71.4 kg (157 lb 6.5 oz)     Gen: AAO x 3, appears of stated age, well-nourished and in NAD  Eyes: sclerae anicteric, conjunctivae appear normal, PEERLA, EOM in tact  ENT: EAC appears normal w/o erythema/exudate.  Overall oral hygiene could be markedly improved, scant dental pieces and existent pieces with cavities and one frontal black tooth.  Mallampati Score: 4  Neck: Supple w/o evidence of LAD, thyroid normal and size and w/o nodularity  CV: RRR w/o murmurs, rubs, gallops. Normal S1/S2. No peripheral edema orJVD.  Lungs: CTAB w/o wheezing, rales, rhonchi. Good air entry, normal chest excursion.  GI: Soft and non-tender, + BS x 4. No rebound or guarding.  Extremities: +2/4 bilateral pedal pulses, cool and dry, no edema.  MS: +5/5 bilateral UE/LE muscle strength testing, no obvious joint deformities, swelling noted, good overall muscle tone  Neuro: No focal neurological deficits    ----------------------------------------------------------------------------------------------------------------------------------------------------------------------------------------------------------------------------------------------------------------  Labs, Imaging & Scoring " Systems:    Lab results since patient's previous encounter were reviewed with the patient.  Pertinent results discussed below or included within the note.    PFTs (Date: none)-    CT Chest: (Date: 11/10/21)-  Patchy peribronchial nodular opacities and bronchiectasis involving the the right middle lobe, upper lobe, right apex and lingula are likely on the basis of chronic infection. Previously demonstrated left lower lobe nodule now measures 6 cm,   previously 4 mm. No new pulmonary nodule or mass is identified  1.  Previously demonstrated left lower lobe nodule now measures 6 mm, previously 4 mm. No new pulmonary nodule or mass is identified.  2.  Patchy peribronchial nodular opacities and bronchiectasis involving the right middle lobe, upper lobe, right apex and lingula are likely on the basis of chronic infection.  3.  Atherosclerosis.  4.  Cirrhosis.  5.  Cholelithiasis.    ECHO, date: none    ----------------------------------------------------------------------------------------------------------------------------------------------------------------------------------------------------------------------------------------------------------------      Impression:    1. Abnormal CT chest with bronchiectasis, tree in bud and pulmonary nodule  2. History of TB, treated in 2015 at the [facility]  3. Suspected TB vs NTM vs middle lobe syndrome.      Discussion:    Nabeel Lopez JrRoland is a 66 y.o. with abnormal CT chest with bronchiectasis, which could represent an active infection or sequelae from TB like disease, other differential diagnosis includes idiopathic bronchiectasis, non TB mycobacterial infection which could explain the tree in bud pattern, and pulmonary nodules. There is one pulm nodule in LLL that is increasing in size from 4mm to 6mm, this is a peripheral lesion.      Plan:  1. Will need to obtain 3 AFB cultures to rule out active TB vs NTM infection. If this is positive for TB will need to follow the  proper local guidelines for treatment (most likely directed at the [facility]) but we will need to prove it first. I have place a referral to ID to request their help in the process. There is a high chance this could be non TB, in which case will need sensitivities to macrolides and re evaluation and discussion regarding treatment.  2. I have placed a regular sputum culture as well to rule out any other bacterial infection.   3. I discussed with him that if workup above is negative might need a bronchoscopy for deeper sampling, he has never had a bronchoscopy before.  4. Suspect this could be a middle lobe syndrome but also idiopathic bronchiectasis is part of the differential among others. Consider quantitative Immunoglobulines in the next OV, and aspergillus workup if first AFBs samples are negative.  5. Once all AFBs are negative consider PFTs.  6. Regarding the peripheral single pulm nodule in LLL, we discussed to obtain a new CT in 3 months to asses if continues to grow. Could be part of the same infectious process but cannot rule out other entities such as malignancy given his smoking hx.  7. Counseling provided about staying away from smoking or any other inhalants.  8. Continue current inhaler regimen:  none    * Patient Education                         - Educated the patient on his/her disease processes                         - Answered all questions to the patients satisfaction.                         - Reminded the patient to bring all of his/her medication bottles to the next office visit.                           * Return To Clinic:  2 months or PRN      The patient voiced understanding of the above treatment approach and agreed with the direction of care. The patient was educated about their conditions and all questions were addressed during this encounter. I have also reminded the patient to bring all of their medications to the next office visit.  Nabeel Lopez Jr. was instructed to call the  "office if any questions or concerns arise prior to their next appointment.      Сергей Ovalles MD FACP  Pulmonary/Critical Care   3/14/2022 9:40 AM    This note reflects my clinical thought processes and is intended primarily for the exchange of information between healthcare providers.  It is not written primarily to communicate with the patient or family directly, which takes place in-person in clinic, by phone/virtual visits or the bedside.  This note might contain sensitive information, including substance use, mental health and consideration of sensitive, serious or other \"do-not-miss\" diagnoses, which is further discussed at the bedside.    "

## 2022-03-23 ENCOUNTER — TELEPHONE (OUTPATIENT)
Dept: INTERNAL MEDICINE | Facility: OTHER | Age: 67
End: 2022-03-23
Payer: COMMERCIAL

## 2022-07-06 ENCOUNTER — APPOINTMENT (OUTPATIENT)
Dept: SLEEP MEDICINE | Facility: MEDICAL CENTER | Age: 67
End: 2022-07-06
Payer: COMMERCIAL

## 2022-07-13 ENCOUNTER — OFFICE VISIT (OUTPATIENT)
Dept: MEDICAL GROUP | Facility: PHYSICIAN GROUP | Age: 67
End: 2022-07-13
Payer: COMMERCIAL

## 2022-07-13 VITALS
HEART RATE: 80 BPM | SYSTOLIC BLOOD PRESSURE: 100 MMHG | OXYGEN SATURATION: 95 % | HEIGHT: 66 IN | WEIGHT: 150.13 LBS | BODY MASS INDEX: 24.13 KG/M2 | TEMPERATURE: 97.6 F | DIASTOLIC BLOOD PRESSURE: 70 MMHG

## 2022-07-13 DIAGNOSIS — R80.9 TYPE 2 DIABETES MELLITUS WITH MICROALBUMINURIA, WITHOUT LONG-TERM CURRENT USE OF INSULIN (HCC): ICD-10-CM

## 2022-07-13 DIAGNOSIS — R91.1 PULMONARY NODULE: ICD-10-CM

## 2022-07-13 DIAGNOSIS — E11.29 TYPE 2 DIABETES MELLITUS WITH MICROALBUMINURIA, WITHOUT LONG-TERM CURRENT USE OF INSULIN (HCC): ICD-10-CM

## 2022-07-13 DIAGNOSIS — R80.9 MICROALBUMINURIA: ICD-10-CM

## 2022-07-13 DIAGNOSIS — R74.8 ELEVATED LIVER ENZYMES: ICD-10-CM

## 2022-07-13 DIAGNOSIS — D75.1 POLYCYTHEMIA: ICD-10-CM

## 2022-07-13 DIAGNOSIS — E78.5 DYSLIPIDEMIA: ICD-10-CM

## 2022-07-13 DIAGNOSIS — I10 ESSENTIAL HYPERTENSION: ICD-10-CM

## 2022-07-13 LAB
ALBUMIN SERPL-MCNC: 3.8 G/DL (ref 3.8–4.8)
ALBUMIN/GLOB SERPL: 1 {RATIO} (ref 1.2–2.2)
ALP SERPL-CCNC: 140 IU/L (ref 44–121)
ALT SERPL-CCNC: 44 IU/L (ref 0–44)
AST SERPL-CCNC: 47 IU/L (ref 0–40)
BASOPHILS # BLD AUTO: 0 X10E3/UL (ref 0–0.2)
BASOPHILS NFR BLD AUTO: 0 %
BILIRUB SERPL-MCNC: 0.9 MG/DL (ref 0–1.2)
BUN SERPL-MCNC: 22 MG/DL (ref 8–27)
BUN/CREAT SERPL: 23 (ref 10–24)
CALCIUM SERPL-MCNC: 8.5 MG/DL (ref 8.6–10.2)
CHLORIDE SERPL-SCNC: 104 MMOL/L (ref 96–106)
CHOLEST SERPL-MCNC: 109 MG/DL (ref 100–199)
CO2 SERPL-SCNC: 16 MMOL/L (ref 20–29)
CREAT SERPL-MCNC: 0.95 MG/DL (ref 0.76–1.27)
EGFRCR SERPLBLD CKD-EPI 2021: 87 ML/MIN/1.73
EOSINOPHIL # BLD AUTO: 0.1 X10E3/UL (ref 0–0.4)
EOSINOPHIL NFR BLD AUTO: 2 %
EPO SERPL-ACNC: 18.5 MIU/ML (ref 2.6–18.5)
ERYTHROCYTE [DISTWIDTH] IN BLOOD BY AUTOMATED COUNT: 12.7 % (ref 11.6–15.4)
GLOBULIN SER CALC-MCNC: 4 G/DL (ref 1.5–4.5)
GLUCOSE SERPL-MCNC: 126 MG/DL (ref 65–99)
HBA1C MFR BLD: 6.7 % (ref 4.8–5.6)
HCT VFR BLD AUTO: 49.6 % (ref 37.5–51)
HDLC SERPL-MCNC: 50 MG/DL
HGB BLD-MCNC: 16.9 G/DL (ref 13–17.7)
IMM GRANULOCYTES # BLD AUTO: 0 X10E3/UL (ref 0–0.1)
IMM GRANULOCYTES NFR BLD AUTO: 0 %
IMMATURE CELLS  115398: ABNORMAL
LABORATORY COMMENT REPORT: NORMAL
LDLC SERPL CALC-MCNC: 44 MG/DL (ref 0–99)
LYMPHOCYTES # BLD AUTO: 2.1 X10E3/UL (ref 0.7–3.1)
LYMPHOCYTES NFR BLD AUTO: 31 %
MCH RBC QN AUTO: 33.9 PG (ref 26.6–33)
MCHC RBC AUTO-ENTMCNC: 34.1 G/DL (ref 31.5–35.7)
MCV RBC AUTO: 100 FL (ref 79–97)
MONOCYTES # BLD AUTO: 0.8 X10E3/UL (ref 0.1–0.9)
MONOCYTES NFR BLD AUTO: 11 %
MORPHOLOGY BLD-IMP: ABNORMAL
NEUTROPHILS # BLD AUTO: 3.8 X10E3/UL (ref 1.4–7)
NEUTROPHILS NFR BLD AUTO: 56 %
NRBC BLD AUTO-RTO: ABNORMAL %
PLATELET # BLD AUTO: 150 X10E3/UL (ref 150–450)
POTASSIUM SERPL-SCNC: 4.4 MMOL/L (ref 3.5–5.2)
PROT SERPL-MCNC: 7.8 G/DL (ref 6–8.5)
PSA SERPL-MCNC: 0.9 NG/ML (ref 0–4)
RBC # BLD AUTO: 4.98 X10E6/UL (ref 4.14–5.8)
SODIUM SERPL-SCNC: 138 MMOL/L (ref 134–144)
TRIGL SERPL-MCNC: 69 MG/DL (ref 0–149)
VLDLC SERPL CALC-MCNC: 15 MG/DL (ref 5–40)
WBC # BLD AUTO: 6.8 X10E3/UL (ref 3.4–10.8)

## 2022-07-13 PROCEDURE — 99214 OFFICE O/P EST MOD 30 MIN: CPT | Performed by: FAMILY MEDICINE

## 2022-07-13 ASSESSMENT — FIBROSIS 4 INDEX: FIB4 SCORE: 2.34

## 2022-11-02 DIAGNOSIS — E11.29 TYPE 2 DIABETES MELLITUS WITH MICROALBUMINURIA, WITHOUT LONG-TERM CURRENT USE OF INSULIN (HCC): ICD-10-CM

## 2022-11-02 DIAGNOSIS — R80.9 TYPE 2 DIABETES MELLITUS WITH MICROALBUMINURIA, WITHOUT LONG-TERM CURRENT USE OF INSULIN (HCC): ICD-10-CM

## 2022-11-02 RX ORDER — EMPAGLIFLOZIN 25 MG/1
1 TABLET, FILM COATED ORAL DAILY
Qty: 90 TABLET | Refills: 0 | Status: SHIPPED | OUTPATIENT
Start: 2022-11-02 | End: 2023-02-03

## 2022-11-14 ENCOUNTER — OFFICE VISIT (OUTPATIENT)
Dept: MEDICAL GROUP | Facility: PHYSICIAN GROUP | Age: 67
End: 2022-11-14
Payer: COMMERCIAL

## 2022-11-14 VITALS
OXYGEN SATURATION: 95 % | HEIGHT: 64 IN | TEMPERATURE: 97.3 F | SYSTOLIC BLOOD PRESSURE: 130 MMHG | DIASTOLIC BLOOD PRESSURE: 78 MMHG | HEART RATE: 80 BPM | BODY MASS INDEX: 26.29 KG/M2 | WEIGHT: 154 LBS

## 2022-11-14 DIAGNOSIS — R74.8 ELEVATED SERUM ALKALINE PHOSPHATASE LEVEL: ICD-10-CM

## 2022-11-14 DIAGNOSIS — E11.29 TYPE 2 DIABETES MELLITUS WITH MICROALBUMINURIA, WITHOUT LONG-TERM CURRENT USE OF INSULIN (HCC): ICD-10-CM

## 2022-11-14 DIAGNOSIS — R74.01 TRANSAMINITIS: ICD-10-CM

## 2022-11-14 DIAGNOSIS — E78.5 DYSLIPIDEMIA: ICD-10-CM

## 2022-11-14 DIAGNOSIS — R80.9 TYPE 2 DIABETES MELLITUS WITH MICROALBUMINURIA, WITHOUT LONG-TERM CURRENT USE OF INSULIN (HCC): ICD-10-CM

## 2022-11-14 DIAGNOSIS — R21 RASH: ICD-10-CM

## 2022-11-14 DIAGNOSIS — Z23 NEED FOR VACCINATION: ICD-10-CM

## 2022-11-14 LAB
HBA1C MFR BLD: 7.6 % (ref 0–5.6)
INT CON NEG: ABNORMAL
INT CON POS: ABNORMAL

## 2022-11-14 PROCEDURE — 99214 OFFICE O/P EST MOD 30 MIN: CPT | Mod: 25 | Performed by: STUDENT IN AN ORGANIZED HEALTH CARE EDUCATION/TRAINING PROGRAM

## 2022-11-14 PROCEDURE — 90471 IMMUNIZATION ADMIN: CPT | Performed by: STUDENT IN AN ORGANIZED HEALTH CARE EDUCATION/TRAINING PROGRAM

## 2022-11-14 PROCEDURE — 90662 IIV NO PRSV INCREASED AG IM: CPT | Performed by: STUDENT IN AN ORGANIZED HEALTH CARE EDUCATION/TRAINING PROGRAM

## 2022-11-14 PROCEDURE — 83036 HEMOGLOBIN GLYCOSYLATED A1C: CPT | Performed by: STUDENT IN AN ORGANIZED HEALTH CARE EDUCATION/TRAINING PROGRAM

## 2022-11-14 RX ORDER — TRIAMCINOLONE ACETONIDE 1 MG/G
1 CREAM TOPICAL 2 TIMES DAILY
Qty: 80 G | Refills: 0 | Status: SHIPPED | OUTPATIENT
Start: 2022-11-14 | End: 2023-08-25 | Stop reason: SDUPTHER

## 2022-11-14 RX ORDER — ATORVASTATIN CALCIUM 20 MG/1
20 TABLET, FILM COATED ORAL DAILY
Qty: 90 TABLET | Refills: 1 | Status: SHIPPED | OUTPATIENT
Start: 2022-11-14 | End: 2023-07-31

## 2022-11-14 ASSESSMENT — FIBROSIS 4 INDEX: FIB4 SCORE: 3.26

## 2022-11-14 NOTE — PROGRESS NOTES
Subjective:     CC:  Diagnoses of Transaminitis, Elevated serum alkaline phosphatase level, Rash, Dyslipidemia, Type 2 diabetes mellitus with microalbuminuria, without long-term current use of insulin (HCC), and Need for vaccination were pertinent to this visit.    HISTORY OF THE PRESENT ILLNESS: Patient is a 67 y.o. male.  This patient is new to me today.  Previously a patient of Dr. Riki Law MD.  This pleasant patient is here today to discuss:    1. Transaminitis  2. Elevated serum alkaline phosphatase level  Patient has not followed up with his GI consultation as recommended by Dr. Law.  No Tylenol/EtOH.    3. Rash  This is new beginning several weeks ago.  Located on his left shoulder only.  It is pruritic.  No change in clothing, soaps, shampoo, laundry detergent.    4. Dyslipidemia  Atorvastatin 20 mg daily.  Patient reports no myalgias.    5. Type 2 diabetes mellitus with microalbuminuria, without long-term current use of insulin (HCC)  Metformin 1000 mg twice daily  Jardiance 25 mg daily  Glipizide SR 5 mg daily    6.  Need for vaccination  Patient desires his influenza vaccine today.    Active Diagnosis:    Patient Active Problem List   Diagnosis    Essential hypertension    Weight loss    History of TB (tuberculosis)    Urinary frequency    Glucosuria    Polyuria    Microscopic hematuria    Need for immunization against influenza    Type 2 diabetes mellitus with microalbuminuria, without long-term current use of insulin (HCC)    Dyslipidemia    Microalbuminuria    Transaminitis    Elevated serum alkaline phosphatase level    Rash      Current Outpatient Medications Ordered in Epic   Medication Sig Dispense Refill    triamcinolone acetonide (KENALOG) 0.1 % Cream Apply 1 Application topically 2 times a day. 80 g 0    atorvastatin (LIPITOR) 20 MG Tab Take 1 Tablet by mouth every day. 90 Tablet 1    Empagliflozin (JARDIANCE) 25 MG Tab Take 1 Tablet by mouth every day at 6 PM. 90 Tablet 0    metformin  "(GLUCOPHAGE) 1000 MG tablet TAKE 1 TABLET BY MOUTH TWICE A DAY WITH MEALS 180 Tablet 1    glipiZIDE SR (GLUCOTROL) 5 MG TABLET SR 24 HR Take 1 Tablet by mouth every day. 90 Tablet 1    Chlorphen-Pseudoephed-APAP (CORICIDIN D PO) Take  by mouth.      lisinopril (PRINIVIL) 10 MG Tab TAKE 1 TABLET BY MOUTH EVERY DAY 90 Tablet 1    clobetasol (TEMOVATE) 0.05 % Cream Apply to affected areas twice daily. 45 g 1     No current [facility]-ordered facility-administered medications on file.     ROS:   Gen: No fevers/chills, no changes in weight  HEENT: No changes in vision/hearing, sore throat.  Pulm: No cough, unexplained SOB.  CV: No chest pain/pressure, no palpitations  GI: No nausea/vomiting, no diarrhea  : No dysuria/nocturia  MSk: No myalgias  Skin: No rash/skin changes  Neuro: No headaches, no numbness/tingling  Heme/Lymph: no easy bruising      Objective:     Exam: /78 (BP Location: Right leg, Patient Position: Sitting, BP Cuff Size: Adult)   Pulse 80   Temp 36.3 °C (97.3 °F) (Temporal)   Ht 1.626 m (5' 4\")   Wt 69.9 kg (154 lb)   SpO2 95%  Body mass index is 26.43 kg/m².    General: Normal appearing. No distress.  HEENT: Normocephalic. Eyes conjunctiva clear lids without ptosis. Pupils equal and reactive to light accommodation. Ears normal shape and contour. Canals are clear bilaterally, tympanic membranes are benign. Nasal mucosa benign, oropharynx is without erythema, edema or exudates.   Neck: Supple without JVD. Thyroid is not enlarged.  Pulmonary: Clear to ausculation.  Normal effort. No rales, ronchi, or wheezing.  Cardiovascular: Regular rate and rhythm without murmur. Radial pulses are intact and equal bilaterally.  Abdomen: Nondistended   neurologic: Grossly nonfocal.  CN II through XII intact.  Lymph: No cervical or supraclavicular lymph nodes are palpable  Skin: Warm and dry.  Left shoulder shows multiple flaky patches that may represent nummular eczema.  Musculoskeletal: Normal gait. No extremity " cyanosis, clubbing, or edema.  Psych: Normal mood and affect. Alert and oriented x3. Judgment and insight are normal.    Monofilament testing with a 10 gram force: sensation intact: intact bilaterally  Visual Inspection: Feet without maceration, ulcers, fissures.  Pedal pulses: Diminished but present bilaterally.  Posttibial pulses normal.      A chaperone was offered to the patient during today's exam. Patient declined chaperone.    Labs:   11/14/2022:  -POCT hemoglobin A1c 7.6%    7/11/2022:  -CBC showing  otherwise normal.  -CMP showing elevated glucose 126, calcium 8.5, AST 47, alk phos 140  -Hemoglobin A1c 6.7% lipid profile showing total cholesterol 109, triglycerides 69, HDL 50, LDL 44.      Assessment & Plan:   67 y.o. male with the following -    1. Transaminitis  2. Elevated serum alkaline phosphatase level  -Undiagnosed problem.  -I have stressed the importance of follow-up with gastroenterology.  -Refer to GI.    3. Rash  -Acute uncomplicated illness.  -Likely atopic dermatitis.  -Triamcinolone cream 0.1% to be applied to affected areas twice daily.  Dispense 80 g.  Refill 0.    4. Dyslipidemia  -Chronic, stable.  Well controlled without side effect.  -Continue atorvastatin 20 mg daily.    5. Type 2 diabetes mellitus with microalbuminuria, without long-term current use of insulin (HCC)  -Chronic, not at goal.  -Improved exercise regimen has been recommended.  We will recheck hemoglobin A1c and 3 months time to see if any medication changes necessary.  -Monofilament exam provided in office today.  -POCT hemoglobin A1c  -Continue Metformin 1000 mg twice daily  -Continue Jardiance 25 mg daily  -Continue glipizide SR 5 mg daily    6.  Need for vaccination  -Influenza vaccine (high-dose). Provided in clinic today.    Return in about 3 months (around 2/14/2023).  For diabetes follow-up.    Please note that this dictation was created using voice recognition software. I have made every reasonable attempt  to correct obvious errors, but I expect that there are errors of grammar and possibly content that I did not discover before finalizing the note.      Thee Villalpando PA-C 11/14/2022

## 2023-01-07 ENCOUNTER — OFFICE VISIT (OUTPATIENT)
Dept: URGENT CARE | Facility: CLINIC | Age: 68
End: 2023-01-07
Payer: COMMERCIAL

## 2023-01-07 VITALS
RESPIRATION RATE: 18 BRPM | TEMPERATURE: 97.8 F | OXYGEN SATURATION: 93 % | HEART RATE: 92 BPM | SYSTOLIC BLOOD PRESSURE: 142 MMHG | WEIGHT: 177 LBS | BODY MASS INDEX: 29.49 KG/M2 | HEIGHT: 65 IN | DIASTOLIC BLOOD PRESSURE: 90 MMHG

## 2023-01-07 DIAGNOSIS — K11.20 SIALADENITIS: ICD-10-CM

## 2023-01-07 PROCEDURE — 99213 OFFICE O/P EST LOW 20 MIN: CPT

## 2023-01-07 RX ORDER — IBUPROFEN 600 MG/1
600 TABLET ORAL EVERY 6 HOURS PRN
Qty: 30 TABLET | Refills: 0 | Status: SHIPPED | OUTPATIENT
Start: 2023-01-07 | End: 2023-01-30

## 2023-01-07 ASSESSMENT — FIBROSIS 4 INDEX: FIB4 SCORE: 3.26

## 2023-01-07 NOTE — LETTER
January 7, 2023    To Whom It May Concern:         This is confirmation that Nabeel John Bullard attended his scheduled appointment with HOLLIE Reeves on 1/07/23. Please excuse him from work 1/7/23-1/9/23.         If you have any questions please do not hesitate to call me at the phone number listed below.      Sincerely,          HOLLIE Reeves   Electronically signed  [phone]

## 2023-01-07 NOTE — PROGRESS NOTES
Subjective:   Nabeel Lopez Jr. is a 67 y.o. male who presents for Oral Swelling (X 2 days, right lower jaw swelling)      HPI: This is a 67-year-old male who presents today for evaluation of right jaw swelling.  This is a new problem.  Patient reports sudden swelling and mild tenderness to right lower jaw that developed last night.  He reports taking 400 mg of Advil last night which was helpful.  He reports pain is sore and is 8\10 today.  He denies fevers, recent illness, dental pain, and ear pain.  He has not experienced this in the past.    ROS per HPI    Medications:    Current Outpatient Medications on File Prior to Visit   Medication Sig Dispense Refill    triamcinolone acetonide (KENALOG) 0.1 % Cream Apply 1 Application topically 2 times a day. 80 g 0    atorvastatin (LIPITOR) 20 MG Tab Take 1 Tablet by mouth every day. 90 Tablet 1    Empagliflozin (JARDIANCE) 25 MG Tab Take 1 Tablet by mouth every day at 6 PM. 90 Tablet 0    metformin (GLUCOPHAGE) 1000 MG tablet TAKE 1 TABLET BY MOUTH TWICE A DAY WITH MEALS 180 Tablet 1    glipiZIDE SR (GLUCOTROL) 5 MG TABLET SR 24 HR Take 1 Tablet by mouth every day. 90 Tablet 1    Chlorphen-Pseudoephed-APAP (CORICIDIN D PO) Take  by mouth.      lisinopril (PRINIVIL) 10 MG Tab TAKE 1 TABLET BY MOUTH EVERY DAY 90 Tablet 1    clobetasol (TEMOVATE) 0.05 % Cream Apply to affected areas twice daily. 45 g 1     No current facility-administered medications on file prior to visit.        Allergies:   Patient has no known allergies.    Problem List:   Patient Active Problem List   Diagnosis    Essential hypertension    Weight loss    History of TB (tuberculosis)    Urinary frequency    Glucosuria    Polyuria    Microscopic hematuria    Need for immunization against influenza    Type 2 diabetes mellitus with microalbuminuria, without long-term current use of insulin (HCC)    Dyslipidemia    Microalbuminuria    Transaminitis    Elevated serum alkaline phosphatase level    Rash     "    Surgical History:  Past Surgical History:   Procedure Laterality Date    CATARACT EXTRACTION WITH IOL Bilateral        Past Social Hx:   Social History     Tobacco Use    Smoking status: Former     Packs/day: 0.50     Years: 32.00     Pack years: 16.00     Types: Cigarettes     Quit date: 1988     Years since quittin.3    Smokeless tobacco: Never   Vaping Use    Vaping Use: Never used   Substance Use Topics    Alcohol use: Yes     Comment: 1/month     Drug use: No          Problem list, medications, and allergies reviewed by myself today in Epic.     Objective:     BP (!) 142/90   Pulse 92   Temp 36.6 °C (97.8 °F) (Temporal)   Resp 18   Ht 1.651 m (5' 5\")   Wt 80.3 kg (177 lb)   SpO2 93%   BMI 29.45 kg/m²     Physical Exam  Vitals and nursing note reviewed.   Constitutional:       General: He is not in acute distress.     Appearance: Normal appearance. He is normal weight. He is not ill-appearing, toxic-appearing or diaphoretic.   HENT:      Head: Normocephalic and atraumatic.      Salivary Glands: Right salivary gland is diffusely enlarged and tender.        Comments: + Mild swelling, +TTP. -Erythema,-increased warmth.     Right Ear: Tympanic membrane, ear canal and external ear normal. There is no impacted cerumen.      Left Ear: Tympanic membrane, ear canal and external ear normal. There is no impacted cerumen.      Nose: Nose normal. No congestion or rhinorrhea.      Mouth/Throat:      Mouth: Mucous membranes are moist.      Pharynx: Oropharynx is clear. Uvula midline. No oropharyngeal exudate or posterior oropharyngeal erythema.      Tonsils: No tonsillar exudate. 0 on the right. 0 on the left.   Cardiovascular:      Rate and Rhythm: Normal rate and regular rhythm.      Pulses: Normal pulses.      Heart sounds: Normal heart sounds. No murmur heard.    No friction rub. No gallop.   Pulmonary:      Effort: Pulmonary effort is normal. No respiratory distress.      Breath sounds: Normal breath " sounds. No stridor. No wheezing, rhonchi or rales.   Chest:      Chest wall: No tenderness.   Musculoskeletal:      Cervical back: Normal range of motion and neck supple. No tenderness.   Lymphadenopathy:      Cervical: No cervical adenopathy.   Skin:     General: Skin is warm and dry.      Capillary Refill: Capillary refill takes less than 2 seconds.   Neurological:      General: No focal deficit present.      Mental Status: He is alert and oriented to person, place, and time. Mental status is at baseline.   Psychiatric:         Mood and Affect: Mood normal.         Behavior: Behavior normal.         Thought Content: Thought content normal.         Judgment: Judgment normal.       Assessment/Plan:     Diagnosis and associated orders:   1. Sialadenitis  ibuprofen (MOTRIN) 600 MG Tab             Comments/MDM:   Pt is clinically stable at today's acute urgent care visit.  No acute distress noted. Appropriate for outpatient management at this time.     Acute problem.  Patient is not ill or toxic appearing clinic today.  Vital signs stable, he is afebrile.  Differentials include but are not limited to viral versus obstructive gland swelling.  I have advised patient to begin taking Motrin 600 mg every 6 hours as prescribed, warm compresses, and discussed eating sour candies.  He is to return for any new or worsening signs or symptoms or for symptoms of fail to improve.  Have also advised patient to follow-up with PCP for recheck.  Patient is agreeable and verbalizes good understanding today.         Discussed DDx, management options (risks,benefits, and alternatives to planned treatment), natural progression and supportive care.  Expressed understanding and the treatment plan was agreed upon. Questions were encouraged and answered   Return to urgent care prn if new or worsening sx or if there is no improvement in condition prn.    Educated in Red flags and indications to immediately call 911 or present to the Emergency  Department.   Advised the patient to follow-up with the primary care physician for recheck, reevaluation, and consideration of further management.    I personally reviewed prior external notes and test results pertinent to today's visit.  I have independently reviewed and interpreted all diagnostics ordered during this urgent care acute visit.       Please note that this dictation was created using voice recognition software. I have made a reasonable attempt to correct obvious errors, but I expect that there are errors of grammar and possibly content that I did not discover before finalizing the note.    This note was electronically signed by CHRIS George

## 2023-01-26 ENCOUNTER — OCCUPATIONAL MEDICINE (OUTPATIENT)
Dept: URGENT CARE | Facility: PHYSICIAN GROUP | Age: 68
End: 2023-01-26
Payer: COMMERCIAL

## 2023-01-26 VITALS
HEART RATE: 82 BPM | DIASTOLIC BLOOD PRESSURE: 78 MMHG | OXYGEN SATURATION: 97 % | WEIGHT: 140 LBS | SYSTOLIC BLOOD PRESSURE: 124 MMHG | TEMPERATURE: 97.3 F | HEIGHT: 65 IN | BODY MASS INDEX: 23.32 KG/M2 | RESPIRATION RATE: 18 BRPM

## 2023-01-26 DIAGNOSIS — S56.912A FOREARM STRAIN, LEFT, INITIAL ENCOUNTER: ICD-10-CM

## 2023-01-26 DIAGNOSIS — S46.912A STRAIN OF LEFT SHOULDER, INITIAL ENCOUNTER: ICD-10-CM

## 2023-01-26 PROCEDURE — 99213 OFFICE O/P EST LOW 20 MIN: CPT | Performed by: PHYSICIAN ASSISTANT

## 2023-01-26 ASSESSMENT — FIBROSIS 4 INDEX: FIB4 SCORE: 3.26

## 2023-01-26 NOTE — LETTER
"EMPLOYEE’S CLAIM FOR COMPENSATION/ REPORT OF INITIAL TREATMENT  FORM C-4    EMPLOYEE’S CLAIM - PROVIDE ALL INFORMATION REQUESTED   First Name  Nabeel Last Name  John Birthdate                    1955                Sex  male Claim Number (Insurer’s Use Only)    Home Address  [address] Age  67 y.o. Height  1.651 m (5' 5\") Weight  63.5 kg (140 lb) [facility]     [facility] Zip  [address] Telephone  [phone] (home)    Mailing Address  [address] [facility] Zip  [address] Primary Language Spoken  English    Insurer   Third-Party   [employer]   Employee's Occupation (Job Title) When Injury or Occupational Disease Occurred      Employer's Name/Company Name  [employer]  Telephone  [phone]    Office Mail Address (Number and Street)   [address] [facility]  Zip  [address]    Date of Injury  1/22/2023               Hours Injury  1:00 AM Date Employer Notified  1/25/2023 Last Day of Work after Injury     or Occupational Disease  1/25/2023 Supervisor to Whom Injury     Reported  Ilya Chatterjee SH[employer]ADS   Address or Location of Accident (if applicable)  Work [1]   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 (Be specific an answer in detail. Use additional sheet if necessary)  I lifted the full slurry bucket with my left arms and fill to slurry drums. I started feeling my arms (left) strain Monday morning.   If you believe that you have an occupational disease, when did you first have knowledge of the disability and it relationship to your employment?   Witnesses to the Accident        Nature of Injury or Occupational Disease  Workers' Compensation  Part(s) of Body Injured or Affected  Shoulder (L), Lower Arm (R), N/A    I certify that " the above is true and correct to the best of my knowledge and that I have provided this information in order to obtain the benefits of [place] of [place]).  I hereby authorize any physician, chiropractor, surgeon, practitioner, or other person, any hospital, including [facility] or [facility], any medical service organization, any insurance company, or other institution or organization to release to each other, any medical or other information, including benefits paid or payable, pertinent to this injury or disease, except information relative to diagnosis, treatment and/or counseling for AIDS, psychological conditions, alcohol or controlled substances, for which I must give specific authorization.  A Photostat of this authorization shall be as valid as the original.     Date   Place Employee’s Original or  *Electronic Signature   THIS REPORT MUST BE COMPLETED AND MAILED WITHIN 3 WORKING DAYS OF TREATMENT   Place  [facility]  Name of Facility  [place]   Date  1/26/2023 Diagnosis and Description of Injury or Occupational Disease  (S46.912A) Strain of left shoulder, initial encounter  (S56.912A) Forearm strain, left, initial encounter Is there evidence the injured employee was under the influence of alcohol and/or another controlled substance at the time of accident?  ? No ? Yes (if yes, please explain)    Hour  9:51 AM   Diagnoses of Strain of left shoulder, initial encounter and Forearm strain, left, initial encounter were pertinent to this visit. No   Treatment  Plan:   - rest, ice application then heat, elevation, Ibuprofen. Gentle increase ROM exercises and stretches as tolerated.   - work restrictions per D39   - follow up in 4 days     Have you advised the patient to remain off work five days or     more?    X-Ray Findings      ? Yes Indicate dates:   From   To      From information  "given by the employee, together with medical evidence, can        you directly connect this injury or occupational disease as job incurred?  Yes ? No If no, is the injured employee capable of:  ? full duty  No ? modified duty  No   Is additional medical care by a physician indicated?  Yes If Modified Duty, Specify any Limitations / Restrictions  Any duty following restrictions per D39 form    Do you know of any previous injury or disease contributing to this condition or occupational disease?  ? Yes ? No (Explain if yes)                          No   Date  1/26/2023 Print Health Care Provider's   Quentin Armenta P.A.-C. I certify the employer’s copy of  this form was mailed on:   Address  [address] Insurer’s Use Only     [facility] Zip  [address]    Provider’s Tax ID Number  [id number] Telephone  Dept: [phone]             Health Care Provider’s Original or Electronic Signature  e-QUENTIN Torres P.A.-C. Degree (MD,DO, DC,DAVONTE,APRN)   PAMINH      * Complete and attach Release of Information (Form C-4A) when injured employee signs C-4 Form electronically  ORIGINAL - TREATING HEALTHCARE PROVIDER PAGE 2 - INSURER/TPA PAGE 3 - EMPLOYER PAGE 4 - EMPLOYEE             Form C-4 (rev.08/21)           BRIEF DESCRIPTION OF RIGHTS AND BENEFITS  (Pursuant to [place] 616C.050)    Notice of Injury or Occupational Disease (Incident Report Form C-1): If an injury or occupational disease (OD) arises out of and in the course of employment, you must provide written notice to your employer as soon as practicable, but no later than 7 days after the accident or OD. Your employer shall maintain a sufficient supply of the required forms.    Claim for Compensation (Form C-4): If medical treatment is sought, the form C-4 is available at the place of initial treatment. A completed \"Claim for Compensation\" (Form C-4) must be filed within 90 days after an accident or OD. The treating physician or chiropractor must, within 3 " working days after treatment, complete and mail to the employer, the employer's insurer and third-party , the Claim for Compensation.    Medical Treatment: If you require medical treatment for your on-the-job injury or OD, you may be required to select a physician or chiropractor from a list provided by your workers’ compensation insurer, if it has contracted with an Organization for Managed Care (MCO) or Preferred Provider Organization (PPO) or providers of health care. If your employer has not entered into a contract with an MCO or PPO, you may select a physician or chiropractor from the Panel of Physicians and Chiropractors. Any medical costs related to your industrial injury or OD will be paid by your insurer.    Temporary Total Disability (TTD): If your doctor has certified that you are unable to work for a period of at least 5 consecutive days, or 5 cumulative days in a 20-day period, or places restrictions on you that your employer does not accommodate, you may be entitled to TTD compensation.    Temporary Partial Disability (TPD): If the wage you receive upon reemployment is less than the compensation for TTD to which you are entitled, the insurer may be required to pay you TPD compensation to make up the difference. TPD can only be paid for a maximum of 24 months.    Permanent Partial Disability (PPD): When your medical condition is stable and there is an indication of a PPD as a result of your injury or OD, within 30 days, your insurer must arrange for an evaluation by a rating physician or chiropractor to determine the degree of your PPD. The amount of your PPD award depends on the date of injury, the results of the PPD evaluation, your age and wage.    Permanent Total Disability (PTD): If you are medically certified by a treating physician or chiropractor as permanently and totally disabled and have been granted a PTD status by your insurer, you are entitled to receive monthly benefits not  to exceed 66 2/3% of your average monthly wage. The amount of your PTD payments is subject to reduction if you previously received a lump-sum PPD award.    Vocational Rehabilitation Services: You may be eligible for vocational rehabilitation services if you are unable to return to the job due to a permanent physical impairment or permanent restrictions as a result of your injury or occupational disease.    Transportation and Per Martin Reimbursement: You may be eligible for travel expenses and per martin associated with medical treatment.    Reopening: You may be able to reopen your claim if your condition worsens after claim closure.     Appeal Process: If you disagree with a written determination issued by the insurer or the insurer does not respond to your request, you may appeal to the Department of Administration, , by following the instructions contained in your determination letter. You must appeal the determination within 70 days from the date of the determination letter at [address], [address], [place] [address], or [address], [address], [place] [address]. If you disagree with the  decision, you may appeal to the Department of Administration, . You must file your appeal within 30 days from the date of the  decision letter at [address], [address], [place], [place] [address], or [address], [address], [place], [place] [address]. If you disagree with a decision of an , you may file a petition for judicial review with the District Court. You must do so within 30 days of the Appeal Officer’s decision. You may be represented by an  at your own expense or you may contact the [employer] for possible representation.    [place]  for Injured Workers (NAIW): If you disagree with a  decision, you may request that NAIW represent you without charge at an   Hearing. For information regarding denial of benefits, you may contact the [facility] at: [address], [address], [place] [address], [phone], or [address], [address], [place] [address], [phone]    To File a Complaint with the Division: If you wish to file a complaint with the  of the Division of Industrial Relations (DIR),  please contact the Workers’ Compensation Section, [address], [address], [place], [place] [address], telephone [phone], or [address], [address], [place], [place] [address], telephone [phone].    For assistance with Workers’ Compensation Issues: You may contact the [facility] Office for Consumer Health Assistance, [address], [address], [place], [place] [address], Toll Free 1-541.742.9686, Web site: http://[facility].[place].gov/Programs/CHAPIN E-mail: chapin@[email or web address].nv.[facility]              __________________________________________________________________                                    _________________            Employee Name / Signature                                                                                                                            Date                                                                                                                                                                                                                              D-2 (rev. 10/20)

## 2023-01-26 NOTE — LETTER
"   [facility] [place]  [address]  [place] [address]  Phone:  [phone] - Fax:  [phone]   [facility] Progress Report and Disability Certification  Date of Service: 1/26/2023   No Show:  No  Date / Time of Next Visit: 1/30/2023   Claim Information   Patient Name: Nabeel Lopez Jr.  Claim Number:     Employer: [employer]  Date of Injury: 1/22/2023     Insurer / TPA: [facility] [address] [facility]  ID / SSN:     Occupation:   Diagnosis: Diagnoses of Strain of left shoulder, initial encounter and Forearm strain, left, initial encounter were pertinent to this visit.    Medical Information   Related to Industrial Injury? Yes    Subjective Complaints:  Date of Injury: 1/22/2023    Initial Visit   LEONARD: Patient was lifting a full slurry bucket with his left arm that was approximately \"40+ pounds\". He lifted the bucket once. The next morning he felt gradual onset of shooting pain to left shoulder, left upper arm, left elbow, and left forearm. Since then he has had a constant ache and soreness worse with movement of his left arm, left shoulder, and gripping. He denies any neck pain, numbness, tingling, or weakness. He denies any chest pain or shortness of breath.  His employer sent him home yesterday due to the pain.  He has tried Advil with temporary relief.  Denies a second job.  Denies a prior left arm or shoulder injury.     Objective Findings:   Physical Exam  Vitals reviewed.   Constitutional:       General: He is not in acute distress.     Appearance: Normal appearance. He is not ill-appearing or toxic-appearing.   Eyes:      Conjunctiva/sclera: Conjunctivae normal.      Pupils: Pupils are equal, round, and reactive to light.   Cardiovascular:      Rate and Rhythm: Normal rate and regular rhythm.      Heart sounds: Normal heart sounds.   Pulmonary:      Effort: Pulmonary effort is normal.      Breath sounds: Normal breath sounds.   Musculoskeletal:    "   Left shoulder: Tenderness (anterior and posterior shoulder) present. No swelling, deformity or crepitus. Normal range of motion. Normal strength. Normal pulse.      Left upper arm: Normal. No swelling.      Left elbow: Tenderness (lateral just distal to elbow) present.      Left forearm: Tenderness present. No swelling, edema, deformity or bony tenderness.      Left wrist: Normal. Normal pulse.      Left hand: Normal. Normal capillary refill. Normal pulse.      Cervical back: Neck supple.   Skin:     General: Skin is warm and dry.   Neurological:      General: No focal deficit present.      Mental Status: He is alert and oriented to person, place, and time.   Psychiatric:         Mood and Affect: Mood normal.         Behavior: Behavior normal.    Pre-Existing Condition(s):     Assessment:   Initial Visit    Status: Additional Care Required  Permanent Disability:No    Plan:      Diagnostics:      Comments:  Plan:   - rest, ice application then heat, elevation, Ibuprofen. Gentle increase ROM exercises and stretches as tolerated.   - work restrictions per D39   - follow up in 4 days     Disability Information   Status: Released to Restricted Duty    From:  1/26/2023  Through: 1/30/2023 Restrictions are: Temporary   Physical Restrictions   Sitting:    Standing:    Stooping:    Bending:      Squatting:    Walking:    Climbing:    Pushing:      Pulling:    Other:    Reaching Above Shoulder (L):   Reaching Above Shoulder (R):       Reaching Below Shoulder (L):    Reaching Below Shoulder (R):      Not to exceed Weight Limits   Carrying(hrs):   Weight Limit(lb):   Lifting(hrs):   Weight  Limit(lb):     Comments: No lifting more than 10 lbs. No lifting, pulling, gripping, pushing more than 5 lbs with left arm. No repetitive overhead reaching with left arm.     Repetitive Actions   Hands: i.e. Fine Manipulations from Grasping:     Feet: i.e. Operating Foot Controls:     Driving / Operate Machinery:     Health Care Provider’s  Original or Electronic Signature  Homer Armenta P.A.-C. Health Care Provider’s Original or Electronic Signature    Kana Maldonado DO MPH     Clinic Name / Location: [address] [address] Clinic Phone Number: Dept: [phone]   Appointment Time: 8:45 Am Visit Start Time: 9:51 AM   Check-In Time:  9:46 Am Visit Discharge Time:  10:40AM   Original-Treating Physician or Chiropractor    Page 2-Insurer/TPA    Page 3-Employer    Page 4-Employee

## 2023-01-26 NOTE — PROGRESS NOTES
"Subjective:     Nabeel Lopez Jr. is a 67 y.o. male who presents for Shoulder Injury (Hurt his left shoulder at work trying to lift a bucket x 4 days )      Date of Injury: 1/22/2023    Initial Visit   LEONARD: Patient was lifting a full slurry bucket with his left arm that was approximately \"40+ pounds\". He lifted the bucket once. The next morning he felt gradual onset of shooting pain to left shoulder, left upper arm, left elbow, and left forearm. Since then he has had a constant ache and soreness worse with movement of his left arm, left shoulder, and gripping. He denies any neck pain, numbness, tingling, or weakness. He denies any chest pain or shortness of breath.  His employer sent him home yesterday due to the pain.  He has tried Advil with temporary relief.  Denies a second job.  Denies a prior left arm or shoulder injury.      PMH:   No pertinent past medical history to this problem  MEDS:  Medications were reviewed in EMR  ALLERGIES:  Allergies were reviewed in EMR  SOCHX:  Works as a    FH:   No pertinent family history to this problem       Objective:     /78   Pulse 82   Temp 36.3 °C (97.3 °F) (Temporal)   Resp 18   Ht 1.651 m (5' 5\")   Wt 63.5 kg (140 lb)   SpO2 97%   BMI 23.30 kg/m²       Physical Exam  Vitals reviewed.   Constitutional:       General: He is not in acute distress.     Appearance: Normal appearance. He is not ill-appearing or toxic-appearing.   Eyes:      Conjunctiva/sclera: Conjunctivae normal.      Pupils: Pupils are equal, round, and reactive to light.   Cardiovascular:      Rate and Rhythm: Normal rate and regular rhythm.      Heart sounds: Normal heart sounds.   Pulmonary:      Effort: Pulmonary effort is normal.      Breath sounds: Normal breath sounds.   Musculoskeletal:      Left shoulder: Tenderness (anterior and posterior shoulder) present. No swelling, deformity or crepitus. Normal range of motion. Normal strength. Normal pulse.      Left upper arm: " Normal. No swelling.      Left elbow: Tenderness (lateral just distal to elbow) present.      Left forearm: Tenderness present. No swelling, edema, deformity or bony tenderness.      Left wrist: Normal. Normal pulse.      Left hand: Normal. Normal capillary refill. Normal pulse.      Cervical back: Neck supple.   Skin:     General: Skin is warm and dry.   Neurological:      General: No focal deficit present.      Mental Status: He is alert and oriented to person, place, and time.   Psychiatric:         Mood and Affect: Mood normal.         Behavior: Behavior normal.     Assessment/Plan:       1. Strain of left shoulder, initial encounter    2. Forearm strain, left, initial encounter    Released to Restricted Duty FROM 1/26/2023 TO 1/30/2023  No lifting more than 10 lbs. No lifting, pulling, gripping, pushing more than 5 lbs with left arm. No repetitive overhead reaching with left arm.   Plan:   - rest, ice application then heat, elevation, Ibuprofen. Gentle increase ROM exercises and stretches as tolerated.   - work restrictions per D39   - follow up in 4 days     Differential diagnosis, natural history, supportive care, and indications for immediate follow-up discussed.

## 2023-01-30 ENCOUNTER — OCCUPATIONAL MEDICINE (OUTPATIENT)
Dept: URGENT CARE | Facility: PHYSICIAN GROUP | Age: 68
End: 2023-01-30
Payer: COMMERCIAL

## 2023-01-30 VITALS
HEIGHT: 66 IN | WEIGHT: 147.8 LBS | DIASTOLIC BLOOD PRESSURE: 76 MMHG | HEART RATE: 80 BPM | RESPIRATION RATE: 18 BRPM | SYSTOLIC BLOOD PRESSURE: 124 MMHG | TEMPERATURE: 98 F | BODY MASS INDEX: 23.75 KG/M2 | OXYGEN SATURATION: 97 %

## 2023-01-30 DIAGNOSIS — S46.912D STRAIN OF LEFT SHOULDER, SUBSEQUENT ENCOUNTER: ICD-10-CM

## 2023-01-30 DIAGNOSIS — S56.912D FOREARM STRAIN, LEFT, SUBSEQUENT ENCOUNTER: ICD-10-CM

## 2023-01-30 PROCEDURE — 99213 OFFICE O/P EST LOW 20 MIN: CPT | Performed by: NURSE PRACTITIONER

## 2023-01-30 ASSESSMENT — VISUAL ACUITY: OU: 1

## 2023-01-30 ASSESSMENT — ENCOUNTER SYMPTOMS: CONSTITUTIONAL NEGATIVE: 1

## 2023-01-30 ASSESSMENT — FIBROSIS 4 INDEX: FIB4 SCORE: 3.26

## 2023-01-30 NOTE — LETTER
"   [facility] [place] [address]  Phone:  [phone] - Fax:  [phone]   [facility] Progress Report and Disability Certification  Date of Service: 1/30/2023   No Show:  No  Date / Time of Next Visit: 2/4/2023   Claim Information   Patient Name: Nabeel Lopez Jr.  Claim Number:     Employer: [employer]  Date of Injury: 1/22/2023     Insurer / TPA: [facility]  ID / SSN:     Occupation:   Diagnosis: Diagnoses of Strain of left shoulder, subsequent encounter and Forearm strain, left, subsequent encounter were pertinent to this visit.    Medical Information   Related to Industrial Injury? Yes    Subjective Complaints:  Initial UC visit 1/26/2023 reviewed for continuity of care:    \"Date of Injury: 1/22/2023    Initial Visit   LEONARD: Patient was lifting a full slurry bucket with his left arm that was approximately \"40+ pounds\". He lifted the bucket once. The next morning he felt gradual onset of shooting pain to left shoulder, left upper arm, left elbow, and left forearm. Since then he has had a constant ache and soreness worse with movement of his left arm, left shoulder, and gripping. He denies any neck pain, numbness, tingling, or weakness. He denies any chest pain or shortness of breath.  His employer sent him home yesterday due to the pain.  He has tried Advil with temporary relief.  Denies a second job.  Denies a prior left arm or shoulder injury.\"    Dx: left shoulder and forearm strain. Tx: Restrictions. RICE. Ibuprofen.    Follow-up UC visit today 1/30/2023:    Symptoms unchanged. Continues to have left shoulder, upper arm, elbow, and proximal forearm pain. Worsens with ROM. Following recommend treatment. Tried using an improvised sling which helped. No other/new symptoms.   Objective Findings: Constitutional:       General: He is not in acute distress.     Appearance: He is well-developed. He is not " ill-appearing or toxic-appearing.   Musculoskeletal:         General: No deformity.      Left shoulder: Tenderness (Anterior, posterior) present. No swelling or deformity. Decreased range of motion (About 90 degrees, all planes, due to pain). Normal strength.      Left upper arm: Tenderness present. No swelling, deformity or bony tenderness.      Left elbow: No swelling or deformity. Normal range of motion. Tenderness present.      Left forearm: Tenderness (Proximal) present. No swelling or deformity.      Left wrist: Normal.      Left hand: Normal.   Skin:     General: Skin is warm and dry.      Capillary Refill: Capillary refill takes less than 2 seconds.      Coloration: Skin is not pale.   Neurological:      Mental Status: He is alert and oriented to person, place, and time.      Motor: No weakness.    Pre-Existing Condition(s):     Assessment:   Condition Same    Status:    Permanent Disability:No    Plan:      Diagnostics:      Comments:  Rest, ice, compression, and elevation (RICE) and over-the-counter acetaminophen alternating with ibuprofen, per 's instructions, as needed for pain. May use gentle massage, stretching, and range of motion exercises as tolerated. May apply heat up to 20 minutes at a time. Continue work restrictions. May use sling at work, but should remove sling every 2 hours to allow gentle range of motion of the upper extremity; should keep sling off while at home. Follow up in 5 days. Seek immediate medical attention if symptoms change/worsen.     Disability Information   Status: Released to Restricted Duty    From:  1/30/2023  Through: 2/4/2023 Restrictions are: Temporary   Physical Restrictions   Sitting:    Standing:    Stooping:    Bending:      Squatting:    Walking:    Climbing:    Pushing:      Pulling:    Other:    Reaching Above Shoulder (L): 0 hrs/day Reaching Above Shoulder (R):       Reaching Below Shoulder (L):    Reaching Below Shoulder (R):      Not to exceed  Weight Limits   Carrying(hrs):   Weight Limit(lb):   Lifting(hrs):   Weight  Limit(lb):     Comments: Lifting, carrying, pushing, pulling with left upper extremity limited to 5 lb    Repetitive Actions   Hands: i.e. Fine Manipulations from Grasping:     Feet: i.e. Operating Foot Controls:     Driving / Operate Machinery:     Health Care Provider’s Original or Electronic Signature  HOLLIE Clark Health Care Provider’s Original or Electronic Signature    Kana Maldonado DO MPH     Clinic Name / Location: [address] [address] Clinic Phone Number: Dept: [phone]   Appointment Time: 8:45 Am Visit Start Time: 8:52 AM   Check-In Time:  8:40 Am Visit Discharge Time:  9:35 AM   Original-Treating Physician or Chiropractor    Page 2-Insurer/TPA    Page 3-Employer    Page 4-Employee

## 2023-01-30 NOTE — PROGRESS NOTES
"Subjective:     Nabeel Lopez Jr. is a 67 y.o. male who presents for Other (Workers Comp f/U. Lft shoulder injury)    Initial UC visit 1/26/2023 reviewed for continuity of care:    \"Date of Injury: 1/22/2023    Initial Visit   LEONARD: Patient was lifting a full slurry bucket with his left arm that was approximately \"40+ pounds\". He lifted the bucket once. The next morning he felt gradual onset of shooting pain to left shoulder, left upper arm, left elbow, and left forearm. Since then he has had a constant ache and soreness worse with movement of his left arm, left shoulder, and gripping. He denies any neck pain, numbness, tingling, or weakness. He denies any chest pain or shortness of breath.  His employer sent him home yesterday due to the pain.  He has tried Advil with temporary relief.  Denies a second job.  Denies a prior left arm or shoulder injury.\"    Dx: left shoulder and forearm strain. Tx: Restrictions. RICE. Ibuprofen.    Follow-up  visit today 1/30/2023:    Symptoms unchanged. Continues to have left shoulder, upper arm, elbow, and proximal forearm pain. Worsens with ROM. Following recommend treatment. Tried using an improvised sling which helped. No other/new symptoms.    Review of Systems   Constitutional: Negative.    Musculoskeletal:         Per HPI   All other systems reviewed and are negative.    Refer to HPI for additional details.    During this visit, appropriate PPE was worn, hand hygiene was performed, and the patient and any visitors were masked.    PMH: No pertinent past medical history to this problem  MEDS: Medications were reviewed in Epic  ALLERGIES: Allergies were reviewed in Epic  SOCHX: Works as a  at [employer]   FH: No pertinent family history to this problem      Objective:     /76 (BP Location: Left arm, Patient Position: Sitting, BP Cuff Size: Adult)   Pulse 80   Temp 36.7 °C (98 °F) (Temporal)   Resp 18   Ht 1.664 m (5' 5.5\")   Wt 67 kg (147 lb 12.8 oz)   " SpO2 97%   BMI 24.22 kg/m²     Physical Exam  Nursing note reviewed.   Constitutional:       General: He is not in acute distress.     Appearance: He is well-developed. He is not ill-appearing or toxic-appearing.   Eyes:      General: Vision grossly intact.   Cardiovascular:      Rate and Rhythm: Normal rate.      Pulses: Normal pulses.   Pulmonary:      Effort: Pulmonary effort is normal. No respiratory distress.   Musculoskeletal:         General: No deformity.      Left shoulder: Tenderness (Anterior, posterior) present. No swelling or deformity. Decreased range of motion (About 90 degrees, all planes, due to pain). Normal strength.      Left upper arm: Tenderness present. No swelling, deformity or bony tenderness.      Left elbow: No swelling or deformity. Normal range of motion. Tenderness present.      Left forearm: Tenderness (Proximal) present. No swelling or deformity.      Left wrist: Normal.      Left hand: Normal.   Skin:     General: Skin is warm and dry.      Capillary Refill: Capillary refill takes less than 2 seconds.      Coloration: Skin is not pale.   Neurological:      Mental Status: He is alert and oriented to person, place, and time.      Motor: No weakness.   Psychiatric:         Behavior: Behavior normal. Behavior is cooperative.       Assessment/Plan:     1. Strain of left shoulder, subsequent encounter    2. Forearm strain, left, subsequent encounter    Rest, ice, compression, and elevation (RICE) and over-the-counter acetaminophen alternating with ibuprofen, per 's instructions, as needed for pain. May use gentle massage, stretching, and range of motion exercises as tolerated. May apply heat up to 20 minutes at a time. Continue work restrictions. May use sling at work, but should remove sling every 2 hours to allow gentle range of motion of the upper extremity; should keep sling off while at home. Follow up in 5 days. Seek immediate medical attention if symptoms change/worsen.      Differential diagnosis, natural history, supportive care, over-the-counter symptom management per 's instructions, close monitoring, and indications for immediate follow-up discussed.     All questions answered. Patient agrees with the plan of care.

## 2023-02-04 ENCOUNTER — OCCUPATIONAL MEDICINE (OUTPATIENT)
Dept: URGENT CARE | Facility: PHYSICIAN GROUP | Age: 68
End: 2023-02-04
Payer: COMMERCIAL

## 2023-02-04 VITALS
OXYGEN SATURATION: 96 % | HEART RATE: 90 BPM | RESPIRATION RATE: 16 BRPM | BODY MASS INDEX: 23.63 KG/M2 | TEMPERATURE: 98.2 F | SYSTOLIC BLOOD PRESSURE: 122 MMHG | WEIGHT: 147 LBS | DIASTOLIC BLOOD PRESSURE: 78 MMHG | HEIGHT: 66 IN

## 2023-02-04 DIAGNOSIS — S56.912A STRAIN OF LEFT FOREARM, INITIAL ENCOUNTER: ICD-10-CM

## 2023-02-04 DIAGNOSIS — S46.912D STRAIN OF LEFT SHOULDER, SUBSEQUENT ENCOUNTER: ICD-10-CM

## 2023-02-04 PROCEDURE — 99213 OFFICE O/P EST LOW 20 MIN: CPT | Performed by: PHYSICIAN ASSISTANT

## 2023-02-04 RX ORDER — METHYLPREDNISOLONE 4 MG/1
TABLET ORAL
Qty: 21 TABLET | Refills: 0 | Status: SHIPPED | OUTPATIENT
Start: 2023-02-04 | End: 2024-01-16

## 2023-02-04 ASSESSMENT — ENCOUNTER SYMPTOMS
CHILLS: 0
TINGLING: 0
FOCAL WEAKNESS: 0
FEVER: 0
SENSORY CHANGE: 0
NAUSEA: 0
MYALGIAS: 1

## 2023-02-04 ASSESSMENT — FIBROSIS 4 INDEX: FIB4 SCORE: 3.26

## 2023-02-04 NOTE — PROGRESS NOTES
"Subjective     Virgilio Lopez Jr. is a 67 y.o. male who presents with Follow-Up ( f/u)      DOI: 1/22/2023. Visit #3. The patient reports no improvement in left shoulder pain or left elbow pain. He has been using OTC ibuprofen and Tylenol with minimal relief. He reports pain with carrying or lifting. He denies numbness or tingling.      HPI    Past Medical History:   Diagnosis Date    Diabetes (HCC)     Dyslipidemia     History of TB (tuberculosis)     Hypertension      Past Surgical History:   Procedure Laterality Date    CATARACT EXTRACTION WITH IOL Bilateral          Family History   Problem Relation Age of Onset    Cancer Brother         lung cancer    Hypertension Mother     Other Father         liver cirrhosis    Psychiatric Illness Brother     Other Brother         pulmonary TB    Stroke Brother          Patient has no known allergies.      Medications, Allergies, and current problem list reviewed today in Epic    Review of Systems   Constitutional:  Negative for chills and fever.   Cardiovascular:  Negative for chest pain.   Gastrointestinal:  Negative for nausea.   Musculoskeletal:  Positive for joint pain (left forearm, left shoulder) and myalgias.   Skin:  Negative for rash.   Neurological:  Negative for tingling, sensory change and focal weakness.      All other systems reviewed and are negative.         Objective     /78   Pulse 90   Temp 36.8 °C (98.2 °F) (Temporal)   Resp 16   Ht 1.664 m (5' 5.5\")   Wt 66.7 kg (147 lb)   SpO2 96%   BMI 24.09 kg/m²      Physical Exam  Constitutional:       General: He is not in acute distress.     Appearance: He is not ill-appearing.   HENT:      Head: Normocephalic and atraumatic.   Eyes:      Conjunctiva/sclera: Conjunctivae normal.   Cardiovascular:      Rate and Rhythm: Normal rate and regular rhythm.   Pulmonary:      Effort: Pulmonary effort is normal. No respiratory distress.      Breath sounds: No stridor. No wheezing.   Skin:     General: Skin is " warm and dry.   Neurological:      General: No focal deficit present.      Mental Status: He is alert and oriented to person, place, and time.   Psychiatric:         Mood and Affect: Mood normal.         Behavior: Behavior normal.         Thought Content: Thought content normal.         Judgment: Judgment normal.       Vitals reviewed.  Left shoulder: FROM. TTP in trapezius.   Left forearm- TTP over dorsal and proximal aspect of forearm. Skin without erythema or edema. FROM of left elbow. Distal n/v intact.                    Assessment & Plan        1. Strain of left shoulder, subsequent encounter    2. Strain of left forearm, initial encounter    - methylPREDNISolone (MEDROL DOSEPAK) 4 MG Tablet Therapy Pack; Follow schedule on package instructions.  Dispense: 21 Tablet; Refill: 0    Monitor sugars while on methylprednisolone   - Referral to Physical Therapy     RICE  Restrictions per D-39  Transfer of Care to [facility].    Differential diagnoses, Supportive care, and indications for immediate follow-up discussed with patient.   Pathogenesis of diagnosis discussed including typical length and natural progression.   Instructed to return to clinic or nearest emergency department for any change in condition, further concerns, or worsening of symptoms.      The patient demonstrated a good understanding and agreed with the treatment plan.      Vivian Child P.A.-C.

## 2023-02-04 NOTE — LETTER
[facility]  [address]  [place] [address]  Phone:  [phone] - Fax:  [phone]   [facility] Progress Report and Disability Certification  Date of Service: 2/4/2023   No Show:  No  Date / Time of Next Visit: 2/14/2023  [facility]    Claim Information   Patient Name: Nabeel Lopez Jr.  Claim Number:     Employer: [employer]  Date of Injury: 1/22/2023     Insurer / TPA: [employer]  ID / SSN:     Occupation:   Diagnosis: Diagnoses of Strain of left shoulder, subsequent encounter and Strain of left forearm, initial encounter were pertinent to this visit.    Medical Information   Related to Industrial Injury? Yes    Subjective Complaints:  DOI: 1/22/2023. Visit #3. The patient reports no improvement in left shoulder pain or left elbow pain. He has been using OTC ibuprofen and Tylenol with minimal relief. He reports pain with carrying or lifting. He denies numbness or tingling.    Objective Findings: Vitals reviewed.  Left shoulder: FROM. TTP in trapezius.   Left forearm- TTP over dorsal and proximal aspect of forearm. Skin without erythema or edema. FROM of left elbow. Distal n/v intact.    Pre-Existing Condition(s):     Assessment:   Condition Same    Status: Additional Care Required  Permanent Disability:No    Plan: MedicationPT  Comments:RX Medrol- watch sugars and diet. RICE. Follow-up with [facility]. PT referral placed.    Diagnostics:      Comments:       Disability Information   Status: Released to Restricted Duty    From:  2/4/2023  Through: 2/14/2023 Restrictions are: Temporary   Physical Restrictions   Sitting:    Standing:    Stooping:    Bending:      Squatting:    Walking:    Climbing:    Pushing:      Pulling:    Other:    Reaching Above Shoulder (L):   Reaching Above Shoulder (R):       Reaching Below Shoulder (L):  0 hrs/day Reaching Below Shoulder (R):      Not to exceed Weight  Limits   Carrying(hrs):   Weight Limit(lb):   Lifting(hrs):   Weight  Limit(lb):     Comments: Lifting, carrying pushing and pulling with left upper extremity limited to 5 lb    Repetitive Actions   Hands: i.e. Fine Manipulations from Grasping:     Feet: i.e. Operating Foot Controls:     Driving / Operate Machinery:     Health Care Provider’s Original or Electronic Signature  Vivian Child P.A.-C. Health Care Provider’s Original or Electronic Signature    Kana Maldonado DO MPH     Clinic Name / Location: [address] [address] Clinic Phone Number: Dept: [phone]   Appointment Time: 9:30 Am Visit Start Time: 10:43 AM   Check-In Time:  9:39 Am Visit Discharge Time:  11:30 AM   Original-Treating Physician or Chiropractor    Page 2-Insurer/TPA    Page 3-Employer    Page 4-Employee

## 2023-02-13 ENCOUNTER — OFFICE VISIT (OUTPATIENT)
Dept: MEDICAL GROUP | Facility: PHYSICIAN GROUP | Age: 68
End: 2023-02-13
Payer: COMMERCIAL

## 2023-02-13 ENCOUNTER — HOSPITAL ENCOUNTER (OUTPATIENT)
Facility: MEDICAL CENTER | Age: 68
End: 2023-02-13
Attending: STUDENT IN AN ORGANIZED HEALTH CARE EDUCATION/TRAINING PROGRAM
Payer: COMMERCIAL

## 2023-02-13 VITALS
RESPIRATION RATE: 16 BRPM | BODY MASS INDEX: 23.91 KG/M2 | HEART RATE: 76 BPM | DIASTOLIC BLOOD PRESSURE: 76 MMHG | SYSTOLIC BLOOD PRESSURE: 138 MMHG | OXYGEN SATURATION: 98 % | WEIGHT: 148.8 LBS | TEMPERATURE: 98.7 F | HEIGHT: 66 IN

## 2023-02-13 DIAGNOSIS — R80.9 TYPE 2 DIABETES MELLITUS WITH MICROALBUMINURIA, WITHOUT LONG-TERM CURRENT USE OF INSULIN (HCC): ICD-10-CM

## 2023-02-13 DIAGNOSIS — E11.29 TYPE 2 DIABETES MELLITUS WITH MICROALBUMINURIA, WITHOUT LONG-TERM CURRENT USE OF INSULIN (HCC): ICD-10-CM

## 2023-02-13 DIAGNOSIS — Z12.11 COLON CANCER SCREENING: ICD-10-CM

## 2023-02-13 DIAGNOSIS — M25.512 ACUTE PAIN OF LEFT SHOULDER: ICD-10-CM

## 2023-02-13 LAB
CREAT UR-MCNC: 59.77 MG/DL
HBA1C MFR BLD: 8.1 % (ref ?–5.8)
MICROALBUMIN UR-MCNC: 8.7 MG/DL
MICROALBUMIN/CREAT UR: 146 MG/G (ref 0–30)
POCT INT CON NEG: NEGATIVE
POCT INT CON POS: POSITIVE
RETINAL SCREEN: NEGATIVE

## 2023-02-13 PROCEDURE — 83036 HEMOGLOBIN GLYCOSYLATED A1C: CPT | Performed by: STUDENT IN AN ORGANIZED HEALTH CARE EDUCATION/TRAINING PROGRAM

## 2023-02-13 PROCEDURE — 82570 ASSAY OF URINE CREATININE: CPT

## 2023-02-13 PROCEDURE — 82043 UR ALBUMIN QUANTITATIVE: CPT

## 2023-02-13 PROCEDURE — 92250 FUNDUS PHOTOGRAPHY W/I&R: CPT | Mod: TC | Performed by: STUDENT IN AN ORGANIZED HEALTH CARE EDUCATION/TRAINING PROGRAM

## 2023-02-13 PROCEDURE — 99214 OFFICE O/P EST MOD 30 MIN: CPT | Performed by: STUDENT IN AN ORGANIZED HEALTH CARE EDUCATION/TRAINING PROGRAM

## 2023-02-13 RX ORDER — CELECOXIB 200 MG/1
200 CAPSULE ORAL 2 TIMES DAILY
Qty: 60 CAPSULE | Refills: 1 | Status: SHIPPED | OUTPATIENT
Start: 2023-02-13 | End: 2024-01-16

## 2023-02-13 ASSESSMENT — PATIENT HEALTH QUESTIONNAIRE - PHQ9: CLINICAL INTERPRETATION OF PHQ2 SCORE: 0

## 2023-02-13 ASSESSMENT — FIBROSIS 4 INDEX: FIB4 SCORE: 3.26

## 2023-02-13 NOTE — PROGRESS NOTES
CC:  Diagnoses of Type 2 diabetes mellitus with microalbuminuria, without long-term current use of insulin ([facility]), Colon cancer screening, and Acute pain of left shoulder were pertinent to this visit.    HISTORY OF THE PRESENT ILLNESS: Patient is a 67 y.o. male. This pleasant patient is here today to discuss:    1. Type 2 diabetes mellitus with microalbuminuria, without long-term current use of insulin (HCC)  Metformin 1000 twice daily  Jardiance 25 daily   glipizide XR 5 mg daily  Patient reports full compliance.    2. Colon cancer screening  Patient is due for colon cancer screening.  He has had Cologuard screening in the past.    3. Acute pain of left shoulder  Pulling injury to the left side with left shoulder and forearm pain.  Patient is seen urgent care, received a Medrol Dosepak and referred to physical therapy.  Patient is not followed up with physical therapy yet.  He feels his symptoms are getting better.  He has been using low-dose ibuprofen daily for management of some soreness and pain.      Active Diagnosis:    Patient Active Problem List   Diagnosis    Essential hypertension    Weight loss    History of TB (tuberculosis)    Urinary frequency    Glucosuria    Polyuria    Microscopic hematuria    Need for immunization against influenza    Type 2 diabetes mellitus with microalbuminuria, without long-term current use of insulin ([facility])    Dyslipidemia    Microalbuminuria    Transaminitis    Elevated serum alkaline phosphatase level    Rash    Acute pain of left shoulder      Current Outpatient Medications Ordered in Epic   Medication Sig Dispense Refill    celecoxib (CELEBREX) 200 MG Cap Take 1 Capsule by mouth 2 times a day. 60 Capsule 1    Dulaglutide 0.75 MG/0.5ML Solution Pen-injector Inject 0.5 mL under the skin every 7 days. 1.96 mL 2    methylPREDNISolone (MEDROL DOSEPAK) 4 MG Tablet Therapy Pack Follow schedule on package instructions. 21 Tablet 0    JARDIANCE 25 MG Tab TAKE 1 TABLET BY MOUTH EVERY  "DAY AT 6 PM. 90 Tablet 1    triamcinolone acetonide (KENALOG) 0.1 % Cream Apply 1 Application topically 2 times a day. 80 g 0    atorvastatin (LIPITOR) 20 MG Tab Take 1 Tablet by mouth every day. 90 Tablet 1    metformin (GLUCOPHAGE) 1000 MG tablet TAKE 1 TABLET BY MOUTH TWICE A DAY WITH MEALS 180 Tablet 1    glipiZIDE SR (GLUCOTROL) 5 MG TABLET SR 24 HR Take 1 Tablet by mouth every day. 90 Tablet 1    Chlorphen-Pseudoephed-APAP (CORICIDIN D PO) Take  by mouth.      lisinopril (PRINIVIL) 10 MG Tab TAKE 1 TABLET BY MOUTH EVERY DAY 90 Tablet 1    clobetasol (TEMOVATE) 0.05 % Cream Apply to affected areas twice daily. 45 g 1     No current [facility]-ordered facility-administered medications on file.     ROS:   See HPI.    Objective:     Exam: /76 (BP Location: Left arm, Patient Position: Sitting, BP Cuff Size: Adult)   Pulse 76   Temp 37.1 °C (98.7 °F) (Temporal)   Resp 16   Ht 1.664 m (5' 5.5\")   Wt 67.5 kg (148 lb 12.8 oz)   SpO2 98%  Body mass index is 24.39 kg/m².    General: Normal appearing. No distress.  neurologic: Grossly nonfocal.  Shoulder/arm exam: Left-sided exam.  No swelling.  Normal active ROM.  Negative drop arm test.  Liftoff, external rotation, Jobes test all very slightly positive.  Slightly tender at the AC joint.  Some tenderness of the extensor surface of the left forearm.  Skin: Warm and dry.  No obvious lesions.  Musculoskeletal: No extremity cyanosis, clubbing, or edema.  Psych: Normal mood and affect.     A chaperone was offered to the patient during today's exam. Patient declined chaperone.      Assessment & Plan:   67 y.o. male with the following -    Labs:   11/14/2022:  -Hemoglobin A1c 7.6%    2/13/2023:  -POCT hemoglobin A1c 8.1%    1. Type 2 diabetes mellitus with microalbuminuria, without long-term current use of insulin (HCC)  -Chronic, unstable/not at goal.  - Add dulaglutide 0.75 mg / 0.5 mL to be injected subcutaneously every 7 days.  Dispense 1.96 mL.  Refill 2.  -Continue " metformin 1000 twice daily  -Continue Jardiance 25 mg daily  -Continue glipizide 5 mg daily.  - POCT Retinal Eye Exam  - POCT  A1C  - Microalbumin Creat Ratio Urine (Clinic Collect); Future    2. Colon cancer screening  - COLOGUARD (FIT DNA)    3. Acute pain of left shoulder  -Acute injury, subsequent encounter.  -Patient urged to follow through with PT.  -DC ibuprofen.  - celecoxib (CELEBREX) 200 MG Cap; Take 1 Capsule by mouth 2 times a day.  Dispense: 60 Capsule; Refill: 1    Return in about 13 weeks (around 5/15/2023).  For follow-up on #1 above    Please note that this dictation was created using voice recognition software. I have made every reasonable attempt to correct obvious errors, but I expect that there are errors of grammar and possibly content that I did not discover before finalizing the note.      Thee Villalpando PA-C 2/13/2023

## 2023-02-24 DIAGNOSIS — I10 ESSENTIAL HYPERTENSION: ICD-10-CM

## 2023-02-24 RX ORDER — LISINOPRIL 10 MG/1
10 TABLET ORAL DAILY
Qty: 90 TABLET | Refills: 0 | Status: SHIPPED | OUTPATIENT
Start: 2023-02-24 | End: 2023-05-15

## 2023-03-09 DIAGNOSIS — R19.5 POSITIVE COLORECTAL CANCER SCREENING USING COLOGUARD TEST: ICD-10-CM

## 2023-03-09 DIAGNOSIS — Z12.12 SCREENING FOR COLORECTAL CANCER: ICD-10-CM

## 2023-03-09 DIAGNOSIS — Z12.11 SCREENING FOR COLORECTAL CANCER: ICD-10-CM

## 2023-03-14 ENCOUNTER — TELEPHONE (OUTPATIENT)
Dept: MEDICAL GROUP | Facility: PHYSICIAN GROUP | Age: 68
End: 2023-03-14
Payer: COMMERCIAL

## 2023-03-14 NOTE — TELEPHONE ENCOUNTER
VOICEMAIL  1. Caller Name: Martita from [employer]                      Call Back Number: [phone]    2. Message: Martita from [employer] called in regards to pt stated he had abnormal colorguard and that if we do not have the results to call back to have them re fax the results. Case # [id number] if we call back include provider NPI     3. Patient approves office to leave a detailed voicemail/MyChart message: N\A

## 2023-05-12 DIAGNOSIS — R80.9 TYPE 2 DIABETES MELLITUS WITH MICROALBUMINURIA, WITHOUT LONG-TERM CURRENT USE OF INSULIN (HCC): ICD-10-CM

## 2023-05-12 DIAGNOSIS — E11.29 TYPE 2 DIABETES MELLITUS WITH MICROALBUMINURIA, WITHOUT LONG-TERM CURRENT USE OF INSULIN (HCC): ICD-10-CM

## 2023-05-12 RX ORDER — GLIPIZIDE 5 MG/1
5 TABLET, FILM COATED, EXTENDED RELEASE ORAL DAILY
Qty: 90 TABLET | Refills: 1 | Status: SHIPPED | OUTPATIENT
Start: 2023-05-12 | End: 2023-08-25

## 2023-05-15 ENCOUNTER — OFFICE VISIT (OUTPATIENT)
Dept: MEDICAL GROUP | Facility: PHYSICIAN GROUP | Age: 68
End: 2023-05-15
Payer: COMMERCIAL

## 2023-05-15 VITALS
BODY MASS INDEX: 24.46 KG/M2 | WEIGHT: 152.2 LBS | OXYGEN SATURATION: 95 % | RESPIRATION RATE: 16 BRPM | TEMPERATURE: 97.5 F | HEIGHT: 66 IN | HEART RATE: 101 BPM | SYSTOLIC BLOOD PRESSURE: 150 MMHG | DIASTOLIC BLOOD PRESSURE: 86 MMHG

## 2023-05-15 DIAGNOSIS — R74.8 ELEVATED SERUM ALKALINE PHOSPHATASE LEVEL: ICD-10-CM

## 2023-05-15 DIAGNOSIS — R74.01 TRANSAMINITIS: ICD-10-CM

## 2023-05-15 DIAGNOSIS — I10 ESSENTIAL HYPERTENSION: ICD-10-CM

## 2023-05-15 DIAGNOSIS — R80.9 TYPE 2 DIABETES MELLITUS WITH MICROALBUMINURIA, WITHOUT LONG-TERM CURRENT USE OF INSULIN (HCC): ICD-10-CM

## 2023-05-15 DIAGNOSIS — E78.5 DYSLIPIDEMIA: ICD-10-CM

## 2023-05-15 DIAGNOSIS — E11.29 TYPE 2 DIABETES MELLITUS WITH MICROALBUMINURIA, WITHOUT LONG-TERM CURRENT USE OF INSULIN (HCC): ICD-10-CM

## 2023-05-15 DIAGNOSIS — D75.89 MACROCYTOSIS WITHOUT ANEMIA: ICD-10-CM

## 2023-05-15 PROCEDURE — 3079F DIAST BP 80-89 MM HG: CPT | Performed by: STUDENT IN AN ORGANIZED HEALTH CARE EDUCATION/TRAINING PROGRAM

## 2023-05-15 PROCEDURE — 3077F SYST BP >= 140 MM HG: CPT | Performed by: STUDENT IN AN ORGANIZED HEALTH CARE EDUCATION/TRAINING PROGRAM

## 2023-05-15 PROCEDURE — 99214 OFFICE O/P EST MOD 30 MIN: CPT | Performed by: STUDENT IN AN ORGANIZED HEALTH CARE EDUCATION/TRAINING PROGRAM

## 2023-05-15 RX ORDER — LOSARTAN POTASSIUM 50 MG/1
50 TABLET ORAL DAILY
Qty: 90 TABLET | Refills: 1 | Status: SHIPPED | OUTPATIENT
Start: 2023-05-15 | End: 2023-08-14

## 2023-05-15 ASSESSMENT — FIBROSIS 4 INDEX: FIB4 SCORE: 3.26

## 2023-05-15 NOTE — PROGRESS NOTES
CC:  Diagnoses of Essential hypertension, Dyslipidemia, Type 2 diabetes mellitus with microalbuminuria, without long-term current use of insulin (HCC), Macrocytosis without anemia, Elevated serum alkaline phosphatase level, and Transaminitis were pertinent to this visit.    HISTORY OF THE PRESENT ILLNESS: Patient is a 67 y.o. male. This pleasant patient is here today to discuss:    1. Essential hypertension  Lisinopril 10 mg daily.  Patient is fully compliant.  He reports his home blood pressure management is to be in the mid 140s.  He is having some chronic throat clearing.    2. Dyslipidemia  Atorvastatin 20 mg daily.  Patient is fully compliant.  No myalgias reported.    3. Type 2 diabetes mellitus with microalbuminuria, without long-term current use of insulin (HCC)  Metformin 1000 twice daily  Jardiance 25 mg daily  Glipizide XR 5 mg daily  Dulaglutide 0.75 mg every 7 days started on his last visit due to elevated hemoglobin A1c.  Patient reports full compliance.    4. Macrocytosis without anemia  Asymptomatic laboratory finding.    5. Elevated serum alkaline phosphatase level  Asymptomatic laboratory finding    6. Transaminitis  Asymptomatic laboratory finding.    Active Diagnosis:    Patient Active Problem List   Diagnosis    Essential hypertension    Weight loss    History of TB (tuberculosis)    Urinary frequency    Glucosuria    Polyuria    Microscopic hematuria    Need for immunization against influenza    Type 2 diabetes mellitus with microalbuminuria, without long-term current use of insulin (HCC)    Dyslipidemia    Microalbuminuria    Transaminitis    Elevated serum alkaline phosphatase level    Rash    Acute pain of left shoulder    Macrocytosis without anemia      Current Outpatient Medications Ordered in Epic   Medication Sig Dispense Refill    losartan (COZAAR) 50 MG Tab Take 1 Tablet by mouth every day. 90 Tablet 1    glipiZIDE SR (GLUCOTROL) 5 MG TABLET SR 24 HR Take 1 Tablet by mouth every day.  "90 Tablet 1    TRULICITY 0.75 MG/0.5ML Solution Pen-injector INJECT 0.5 ML UNDER THE SKIN EVERY 7 DAYS 2 mL 5    celecoxib (CELEBREX) 200 MG Cap Take 1 Capsule by mouth 2 times a day. 60 Capsule 1    JARDIANCE 25 MG Tab TAKE 1 TABLET BY MOUTH EVERY DAY AT 6 PM. 90 Tablet 1    triamcinolone acetonide (KENALOG) 0.1 % Cream Apply 1 Application topically 2 times a day. 80 g 0    atorvastatin (LIPITOR) 20 MG Tab Take 1 Tablet by mouth every day. 90 Tablet 1    metformin (GLUCOPHAGE) 1000 MG tablet TAKE 1 TABLET BY MOUTH TWICE A DAY WITH MEALS 180 Tablet 1    Chlorphen-Pseudoephed-APAP (CORICIDIN D PO) Take  by mouth.      clobetasol (TEMOVATE) 0.05 % Cream Apply to affected areas twice daily. 45 g 1    methylPREDNISolone (MEDROL DOSEPAK) 4 MG Tablet Therapy Pack Follow schedule on package instructions. (Patient not taking: Reported on 5/15/2023) 21 Tablet 0     No current Epic-ordered facility-administered medications on file.     ROS:   See HPI.    Objective:     Exam: BP (!) 150/86 (BP Location: Left arm, Patient Position: Sitting, BP Cuff Size: Adult)   Pulse (!) 101   Temp 36.4 °C (97.5 °F) (Temporal)   Resp 16   Ht 1.664 m (5' 5.5\")   Wt 69 kg (152 lb 3.2 oz)   SpO2 95%  Body mass index is 24.94 kg/m².    General: Normal appearing. No distress.  Pulmonary: Clear to ausculation.  Normal effort. No rales, ronchi, or wheezing.  Cardiovascular: Regular rate and rhythm without murmur.   HEENT: Oropharynx without posterior erythema, cobblestoning or evidence of postnasal drip.  Nasal mucosa benign.  neurologic: Grossly nonfocal.    Skin: Warm and dry.  No obvious lesions.  Musculoskeletal: No extremity cyanosis, clubbing, or edema.  Psych: Normal mood and affect.             Assessment & Plan:   67 y.o. male with the following -    Labs:   2/13/2023:  -Hemoglobin A1c 8.1%    7/11/2022:  -CBC showing   -CMP showing elevated fasting glucose 126, calcium 8.5, AST 47, alk phos 140  -Lipid profile showed total " cholesterol 109, triglycerides 69, HDL 50, LDL 44.    1. Essential hypertension  -Chronic, unstable, possible side effect.  -Patient is not at goal for blood pressure and his lisinopril may be contributing to his constant throat clearing.  -DC lisinopril.  -Start losartan (COZAAR) 50 MG Tab; Take 1 Tablet by mouth every day.  Dispense: 90 Tablet; Refill: 1  -Patient report return a 7-day blood pressure log next week.    2. Dyslipidemia  -Chronic, stable.  -Continue atorvastatin 20 mg daily.  - Lipid Profile; Future    3. Type 2 diabetes mellitus with microalbuminuria, without long-term current use of insulin (HCC)  -Chronic, unstable/not at goal.  -Continue metformin 1000 mg twice daily.  -Continue Jardiance 25 mg daily.  -Continue dulaglutide 0.5 mg weekly.  -Continue glipizide XR 5 mg daily.  -Await on the patient's hemoglobin A1c and consider increase in the dose of dulaglutide and/or the discontinuation of glipizide as indicated by the A1c.  - HEMOGLOBIN A1C; today  - HEMOGLOBIN A1C; in 3 months.  - Comp Metabolic Panel; Future    4. Macrocytosis without anemia  -Undiagnosed problem.  - CBC WITHOUT DIFFERENTIAL; Future  - VIT B12,  FOLIC ACID    5. Elevated serum alkaline phosphatase level  -Undiagnosed problem, possibly transient.  - Comp Metabolic Panel; Future    6. Transaminitis  -Undiagnosed problem.  - Comp Metabolic Panel; Future    Return in about 14 weeks (around 8/21/2023).  For follow-up on above conditions.    Please note that this dictation was created using voice recognition software. I have made every reasonable attempt to correct obvious errors, but I expect that there are errors of grammar and possibly content that I did not discover before finalizing the note.      Thee Villalpando PA-C 5/15/2023

## 2023-05-15 NOTE — PATIENT INSTRUCTIONS
For management of blood pressure:    Discontinue lisinopril.    Start Losartan 50 mg daily.    Send me a 7 day blood pressure log after starting the new medication.    For congestion try:    Fexofenadine (Allegra) 180 mg daily preparation. Take one each day.

## 2023-05-17 ENCOUNTER — HOSPITAL ENCOUNTER (OUTPATIENT)
Dept: LAB | Facility: MEDICAL CENTER | Age: 68
End: 2023-05-17
Attending: STUDENT IN AN ORGANIZED HEALTH CARE EDUCATION/TRAINING PROGRAM
Payer: COMMERCIAL

## 2023-05-17 DIAGNOSIS — R80.9 TYPE 2 DIABETES MELLITUS WITH MICROALBUMINURIA, WITHOUT LONG-TERM CURRENT USE OF INSULIN (HCC): ICD-10-CM

## 2023-05-17 DIAGNOSIS — E11.29 TYPE 2 DIABETES MELLITUS WITH MICROALBUMINURIA, WITHOUT LONG-TERM CURRENT USE OF INSULIN (HCC): ICD-10-CM

## 2023-05-17 LAB
EST. AVERAGE GLUCOSE BLD GHB EST-MCNC: 146 MG/DL
HBA1C MFR BLD: 6.7 % (ref 4–5.6)

## 2023-05-17 PROCEDURE — 36415 COLL VENOUS BLD VENIPUNCTURE: CPT

## 2023-05-17 PROCEDURE — 83036 HEMOGLOBIN GLYCOSYLATED A1C: CPT

## 2023-07-18 DIAGNOSIS — E11.65 UNCONTROLLED TYPE 2 DIABETES MELLITUS WITH HYPERGLYCEMIA (HCC): ICD-10-CM

## 2023-07-29 DIAGNOSIS — E78.5 DYSLIPIDEMIA: ICD-10-CM

## 2023-07-31 RX ORDER — ATORVASTATIN CALCIUM 20 MG/1
20 TABLET, FILM COATED ORAL DAILY
Qty: 90 TABLET | Refills: 0 | Status: SHIPPED | OUTPATIENT
Start: 2023-07-31

## 2023-08-21 ENCOUNTER — APPOINTMENT (OUTPATIENT)
Dept: MEDICAL GROUP | Facility: PHYSICIAN GROUP | Age: 68
End: 2023-08-21
Payer: COMMERCIAL

## 2023-08-23 ENCOUNTER — HOSPITAL ENCOUNTER (OUTPATIENT)
Dept: LAB | Facility: MEDICAL CENTER | Age: 68
End: 2023-08-23
Attending: STUDENT IN AN ORGANIZED HEALTH CARE EDUCATION/TRAINING PROGRAM
Payer: COMMERCIAL

## 2023-08-23 DIAGNOSIS — R74.01 TRANSAMINITIS: ICD-10-CM

## 2023-08-23 DIAGNOSIS — D75.89 MACROCYTOSIS WITHOUT ANEMIA: ICD-10-CM

## 2023-08-23 DIAGNOSIS — R74.8 ELEVATED SERUM ALKALINE PHOSPHATASE LEVEL: ICD-10-CM

## 2023-08-23 DIAGNOSIS — R80.9 TYPE 2 DIABETES MELLITUS WITH MICROALBUMINURIA, WITHOUT LONG-TERM CURRENT USE OF INSULIN (HCC): ICD-10-CM

## 2023-08-23 DIAGNOSIS — E78.5 DYSLIPIDEMIA: ICD-10-CM

## 2023-08-23 DIAGNOSIS — E11.29 TYPE 2 DIABETES MELLITUS WITH MICROALBUMINURIA, WITHOUT LONG-TERM CURRENT USE OF INSULIN (HCC): ICD-10-CM

## 2023-08-23 LAB
ALBUMIN SERPL BCP-MCNC: 3.9 G/DL (ref 3.2–4.9)
ALBUMIN/GLOB SERPL: 1 G/DL
ALP SERPL-CCNC: 133 U/L (ref 30–99)
ALT SERPL-CCNC: 59 U/L (ref 2–50)
ANION GAP SERPL CALC-SCNC: 12 MMOL/L (ref 7–16)
AST SERPL-CCNC: 40 U/L (ref 12–45)
BILIRUB SERPL-MCNC: 1.3 MG/DL (ref 0.1–1.5)
BUN SERPL-MCNC: 17 MG/DL (ref 8–22)
CALCIUM ALBUM COR SERPL-MCNC: 9.2 MG/DL (ref 8.5–10.5)
CALCIUM SERPL-MCNC: 9.1 MG/DL (ref 8.5–10.5)
CHLORIDE SERPL-SCNC: 104 MMOL/L (ref 96–112)
CHOLEST SERPL-MCNC: 131 MG/DL (ref 100–199)
CO2 SERPL-SCNC: 22 MMOL/L (ref 20–33)
CREAT SERPL-MCNC: 1.1 MG/DL (ref 0.5–1.4)
ERYTHROCYTE [DISTWIDTH] IN BLOOD BY AUTOMATED COUNT: 44.6 FL (ref 35.9–50)
EST. AVERAGE GLUCOSE BLD GHB EST-MCNC: 140 MG/DL
FOLATE SERPL-MCNC: 9.5 NG/ML
GFR SERPLBLD CREATININE-BSD FMLA CKD-EPI: 72 ML/MIN/1.73 M 2
GLOBULIN SER CALC-MCNC: 4.1 G/DL (ref 1.9–3.5)
GLUCOSE SERPL-MCNC: 137 MG/DL (ref 65–99)
HBA1C MFR BLD: 6.5 % (ref 4–5.6)
HCT VFR BLD AUTO: 50.5 % (ref 42–52)
HDLC SERPL-MCNC: 59 MG/DL
HGB BLD-MCNC: 17.3 G/DL (ref 14–18)
LDLC SERPL CALC-MCNC: 57 MG/DL
MCH RBC QN AUTO: 33.3 PG (ref 27–33)
MCHC RBC AUTO-ENTMCNC: 34.3 G/DL (ref 32.3–36.5)
MCV RBC AUTO: 97.1 FL (ref 81.4–97.8)
PLATELET # BLD AUTO: 128 K/UL (ref 164–446)
PMV BLD AUTO: 11.4 FL (ref 9–12.9)
POTASSIUM SERPL-SCNC: 4.3 MMOL/L (ref 3.6–5.5)
PROT SERPL-MCNC: 8 G/DL (ref 6–8.2)
RBC # BLD AUTO: 5.2 M/UL (ref 4.7–6.1)
SODIUM SERPL-SCNC: 138 MMOL/L (ref 135–145)
TRIGL SERPL-MCNC: 77 MG/DL (ref 0–149)
VIT B12 SERPL-MCNC: 1328 PG/ML (ref 211–911)
WBC # BLD AUTO: 6.9 K/UL (ref 4.8–10.8)

## 2023-08-23 PROCEDURE — 36415 COLL VENOUS BLD VENIPUNCTURE: CPT

## 2023-08-23 PROCEDURE — 82746 ASSAY OF FOLIC ACID SERUM: CPT

## 2023-08-23 PROCEDURE — 80053 COMPREHEN METABOLIC PANEL: CPT

## 2023-08-23 PROCEDURE — 85027 COMPLETE CBC AUTOMATED: CPT

## 2023-08-23 PROCEDURE — 80061 LIPID PANEL: CPT

## 2023-08-23 PROCEDURE — 82607 VITAMIN B-12: CPT

## 2023-08-23 PROCEDURE — 83036 HEMOGLOBIN GLYCOSYLATED A1C: CPT

## 2023-08-25 ENCOUNTER — OFFICE VISIT (OUTPATIENT)
Dept: MEDICAL GROUP | Facility: PHYSICIAN GROUP | Age: 68
End: 2023-08-25
Payer: COMMERCIAL

## 2023-08-25 VITALS
HEIGHT: 66 IN | HEART RATE: 89 BPM | DIASTOLIC BLOOD PRESSURE: 76 MMHG | OXYGEN SATURATION: 94 % | SYSTOLIC BLOOD PRESSURE: 140 MMHG | BODY MASS INDEX: 25.2 KG/M2 | WEIGHT: 156.8 LBS | TEMPERATURE: 97.3 F

## 2023-08-25 DIAGNOSIS — R74.01 TRANSAMINITIS: ICD-10-CM

## 2023-08-25 DIAGNOSIS — I10 ESSENTIAL HYPERTENSION: ICD-10-CM

## 2023-08-25 DIAGNOSIS — R21 RASH: ICD-10-CM

## 2023-08-25 DIAGNOSIS — E11.29 TYPE 2 DIABETES MELLITUS WITH MICROALBUMINURIA, WITHOUT LONG-TERM CURRENT USE OF INSULIN (HCC): ICD-10-CM

## 2023-08-25 DIAGNOSIS — R80.9 TYPE 2 DIABETES MELLITUS WITH MICROALBUMINURIA, WITHOUT LONG-TERM CURRENT USE OF INSULIN (HCC): ICD-10-CM

## 2023-08-25 DIAGNOSIS — G47.26 SHIFT WORK SLEEP DISORDER: ICD-10-CM

## 2023-08-25 PROCEDURE — 99214 OFFICE O/P EST MOD 30 MIN: CPT | Performed by: STUDENT IN AN ORGANIZED HEALTH CARE EDUCATION/TRAINING PROGRAM

## 2023-08-25 PROCEDURE — 3077F SYST BP >= 140 MM HG: CPT | Performed by: STUDENT IN AN ORGANIZED HEALTH CARE EDUCATION/TRAINING PROGRAM

## 2023-08-25 PROCEDURE — 3078F DIAST BP <80 MM HG: CPT | Performed by: STUDENT IN AN ORGANIZED HEALTH CARE EDUCATION/TRAINING PROGRAM

## 2023-08-25 RX ORDER — LOSARTAN POTASSIUM AND HYDROCHLOROTHIAZIDE 25; 100 MG/1; MG/1
1 TABLET ORAL DAILY
Qty: 90 TABLET | Refills: 1 | Status: SHIPPED | OUTPATIENT
Start: 2023-08-25

## 2023-08-25 RX ORDER — TRAZODONE HYDROCHLORIDE 50 MG/1
50 TABLET ORAL NIGHTLY
Qty: 30 TABLET | Refills: 3 | Status: SHIPPED | OUTPATIENT
Start: 2023-08-25 | End: 2023-09-21

## 2023-08-25 RX ORDER — TRIAMCINOLONE ACETONIDE 1 MG/G
1 CREAM TOPICAL 2 TIMES DAILY
Qty: 80 G | Refills: 2 | Status: SHIPPED | OUTPATIENT
Start: 2023-08-25

## 2023-08-25 ASSESSMENT — FIBROSIS 4 INDEX: FIB4 SCORE: 2.81

## 2023-08-25 NOTE — PROGRESS NOTES
CC:  Diagnoses of Essential hypertension, Shift work sleep disorder, Type 2 diabetes mellitus with microalbuminuria, without long-term current use of insulin (HCC), Rash, and Transaminitis were pertinent to this visit.    HISTORY OF THE PRESENT ILLNESS: Patient is a 67 y.o. male. This pleasant patient is here today to discuss:    1. Essential hypertension  Patient reports that he has been taking his losartan 50 mg twice daily.  He reports that he is quite consistent with this.    2. Shift work sleep disorder  Patient works night shift, he gets off about 7:15 in the morning.  When he gets home he has difficulty falling asleep although once he falls asleep he is able to sleep quite well.  He is requesting assistance with this.    3. Type 2 diabetes mellitus with microalbuminuria, without long-term current use of insulin ([facility])  Glucotide 0.75 mg weekly.  Metformin 1000 mg twice daily.    Jardiance 25 mg daily.    Glipizide 5 mg daily.    Patient reports full compliance.    4. Rash  Patient has to wear chemical resistant safety gloves at work.  Sometimes he gets some cracking of the knuckles and hands associated with this.  He has been wearing cotton gloves underneath the protect his hands and this does seem to help quite a bit.  He is use some steroid creams in the past which offer him significant relief.    5. Transaminitis  Recent asymptomatic laboratory finding.  Follow-up labs have already been ordered.    Active Diagnosis:    Patient Active Problem List   Diagnosis    Essential hypertension    Weight loss    History of TB (tuberculosis)    Urinary frequency    Glucosuria    Polyuria    Microscopic hematuria    Need for immunization against influenza    Type 2 diabetes mellitus with microalbuminuria, without long-term current use of insulin (HCC)    Dyslipidemia    Microalbuminuria    Transaminitis    Elevated serum alkaline phosphatase level    Rash    Acute pain of left shoulder    Macrocytosis without anemia     "Shift work sleep disorder      Current Outpatient Medications Ordered in Epic   Medication Sig Dispense Refill    traZODone (DESYREL) 50 MG Tab Take 1 Tablet by mouth every evening. 30 Tablet 3    losartan-hydrochlorothiazide (HYZAAR) 100-25 MG per tablet Take 1 Tablet by mouth every day. 90 Tablet 1    Dulaglutide 1.5 MG/0.5ML Solution Pen-injector Inject 0.5 mL under the skin every 7 days. 2.24 mL 5    triamcinolone acetonide (KENALOG) 0.1 % Cream Apply 1 Application  topically 2 times a day. 80 g 2    atorvastatin (LIPITOR) 20 MG Tab TAKE 1 TABLET BY MOUTH EVERY DAY 90 Tablet 0    metformin (GLUCOPHAGE) 1000 MG tablet Take 1 Tablet by mouth 2 times a day with meals. 180 Tablet 1    celecoxib (CELEBREX) 200 MG Cap Take 1 Capsule by mouth 2 times a day. 60 Capsule 1    methylPREDNISolone (MEDROL DOSEPAK) 4 MG Tablet Therapy Pack Follow schedule on package instructions. 21 Tablet 0    JARDIANCE 25 MG Tab TAKE 1 TABLET BY MOUTH EVERY DAY AT 6 PM. 90 Tablet 1    Chlorphen-Pseudoephed-APAP (CORICIDIN D PO) Take  by mouth.      clobetasol (TEMOVATE) 0.05 % Cream Apply to affected areas twice daily. 45 g 1     No current [facility]-ordered facility-administered medications on file.     ROS:   See HPI.    Objective:     Exam: BP (!) 140/76 (BP Location: Left arm, Patient Position: Sitting, BP Cuff Size: Adult)   Pulse 89   Temp 36.3 °C (97.3 °F) (Temporal)   Ht 1.664 m (5' 5.5\")   Wt 71.1 kg (156 lb 12.8 oz)   SpO2 94%  Body mass index is 25.7 kg/m².    General: Normal appearing. No distress.  neurologic: Grossly nonfocal.    Skin: Warm and dry.  No obvious lesions.  Musculoskeletal: No extremity cyanosis, clubbing, or edema.  Psych: Normal mood and affect.             Assessment & Plan:   67 y.o. male with the following -    Labs:   8/23/2023:   -CBC showing platelet 128 K, otherwise normal.  -CMP showing glucose 137, ALT 59, alk phos 133, globulin 4.1  -Hemoglobin A1c 6.5%.  -Lipid profile showed total cholesterol 131, " triglycerides 77, HDL 59, LDL 57  -Folic, WNL  -B12 1328.    1. Essential hypertension  -Chronic, not at goal.  -DC losartan 50 mg twice daily.  -Start losartan-hydrochlorothiazide (HYZAAR) 100-25 MG per tablet; Take 1 Tablet by mouth every day.  Dispense: 90 Tablet; Refill: 1    2. Shift work sleep disorder  -Chronic.  -Trial with traZODone (DESYREL) 50 MG Tab; Take 1 Tablet by mouth every evening.  Dispense: 30 Tablet; Refill: 3    3. Type 2 diabetes mellitus with microalbuminuria, without long-term current use of insulin (HCC)  -Chronic, stable.  Well treated without side effect.  -Increased dose of dulaglutide and discontinue glipizide to simplify the patient's medication regimen, reduce cost and hopefully offer a small amount of weight loss.  -discontinue dulaglutide 0.75 mg / 0.5 mL every 7 days.  -Start dulaglutide 1.5 MG/0.5ML Solution Pen-injector; Inject 0.5 mL under the skin every 7 days.  Dispense: 2.24 mL; Refill: 5  -Continue metformin 1000 mg twice daily.  -Continue Jardiance 25 mg weekly.    4. Rash  -Chronic, and recurrence.  - triamcinolone acetonide (KENALOG) 0.1 % Cream; Apply 1 Application  topically 2 times a day.  Dispense: 80 g; Refill: 2    5. Transaminitis  -undiagnosed problem with uncertain prognosis.  -Patient reminded to follow through with previously ordered labs.    Return in about 3 months (around 11/25/2023).    Please note that this dictation was created using voice recognition software. I have made every reasonable attempt to correct obvious errors, but I expect that there are errors of grammar and possibly content that I did not discover before finalizing the note.      Thee iVllalpando PA-C 8/25/2023

## 2023-08-25 NOTE — PATIENT INSTRUCTIONS
For diabetes:    We have increased your trulicity to 1.5 mg every 7 days.    Keep using metformin 1000 mg twice daily.    Keep using Jardiance 25 mg daily.    Discontinue the glipizide.    For blood pressure:    We have switched your medicine to Losartan-Hctz 100-25. Take one each day.    For Insomnia (shift work sleep disorder):    Trial trazodone 50 mg 30 minutes before you wish to sleep.

## 2023-09-15 ENCOUNTER — DOCUMENTATION (OUTPATIENT)
Dept: HEALTH INFORMATION MANAGEMENT | Facility: OTHER | Age: 68
End: 2023-09-15
Payer: COMMERCIAL

## 2023-10-09 ENCOUNTER — APPOINTMENT (OUTPATIENT)
Dept: RADIOLOGY | Facility: MEDICAL CENTER | Age: 68
End: 2023-10-09
Attending: STUDENT IN AN ORGANIZED HEALTH CARE EDUCATION/TRAINING PROGRAM
Payer: COMMERCIAL

## 2023-11-14 ENCOUNTER — HOSPITAL ENCOUNTER (OUTPATIENT)
Dept: RADIOLOGY | Facility: MEDICAL CENTER | Age: 68
End: 2023-11-14
Attending: STUDENT IN AN ORGANIZED HEALTH CARE EDUCATION/TRAINING PROGRAM
Payer: COMMERCIAL

## 2023-11-14 DIAGNOSIS — R74.8 ELEVATED SERUM ALKALINE PHOSPHATASE LEVEL: ICD-10-CM

## 2023-11-14 DIAGNOSIS — R74.01 TRANSAMINITIS: ICD-10-CM

## 2023-11-14 PROCEDURE — 76705 ECHO EXAM OF ABDOMEN: CPT

## 2023-11-20 ENCOUNTER — TELEPHONE (OUTPATIENT)
Dept: HEALTH INFORMATION MANAGEMENT | Facility: OTHER | Age: 68
End: 2023-11-20
Payer: COMMERCIAL

## 2024-01-16 ENCOUNTER — OFFICE VISIT (OUTPATIENT)
Dept: MEDICAL GROUP | Facility: MEDICAL CENTER | Age: 69
End: 2024-01-16
Payer: COMMERCIAL

## 2024-01-16 VITALS
HEIGHT: 66 IN | WEIGHT: 156 LBS | HEART RATE: 80 BPM | TEMPERATURE: 97.1 F | BODY MASS INDEX: 25.07 KG/M2 | SYSTOLIC BLOOD PRESSURE: 156 MMHG | DIASTOLIC BLOOD PRESSURE: 86 MMHG | OXYGEN SATURATION: 96 %

## 2024-01-16 DIAGNOSIS — R31.29 MICROSCOPIC HEMATURIA: ICD-10-CM

## 2024-01-16 DIAGNOSIS — D75.89 MACROCYTOSIS WITHOUT ANEMIA: ICD-10-CM

## 2024-01-16 DIAGNOSIS — E11.29 TYPE 2 DIABETES MELLITUS WITH MICROALBUMINURIA, WITHOUT LONG-TERM CURRENT USE OF INSULIN (HCC): ICD-10-CM

## 2024-01-16 DIAGNOSIS — Z23 NEED FOR VACCINATION: ICD-10-CM

## 2024-01-16 DIAGNOSIS — E78.5 DYSLIPIDEMIA: ICD-10-CM

## 2024-01-16 DIAGNOSIS — K74.60 LIVER DISEASE, CHRONIC, WITH CIRRHOSIS (HCC): ICD-10-CM

## 2024-01-16 DIAGNOSIS — Z11.59 NEED FOR HEPATITIS B SCREENING TEST: ICD-10-CM

## 2024-01-16 DIAGNOSIS — R80.9 TYPE 2 DIABETES MELLITUS WITH MICROALBUMINURIA, WITHOUT LONG-TERM CURRENT USE OF INSULIN (HCC): ICD-10-CM

## 2024-01-16 DIAGNOSIS — Z11.59 ENCOUNTER FOR HEPATITIS C SCREENING TEST FOR LOW RISK PATIENT: ICD-10-CM

## 2024-01-16 DIAGNOSIS — R19.5 POSITIVE COLORECTAL CANCER SCREENING USING COLOGUARD TEST: ICD-10-CM

## 2024-01-16 DIAGNOSIS — E11.65 UNCONTROLLED TYPE 2 DIABETES MELLITUS WITH HYPERGLYCEMIA (HCC): ICD-10-CM

## 2024-01-16 DIAGNOSIS — K76.9 LIVER DISEASE, CHRONIC, WITH CIRRHOSIS (HCC): ICD-10-CM

## 2024-01-16 DIAGNOSIS — Z12.11 ENCOUNTER FOR SCREENING COLONOSCOPY: ICD-10-CM

## 2024-01-16 PROBLEM — M25.512 ACUTE PAIN OF LEFT SHOULDER: Status: RESOLVED | Noted: 2023-02-13 | Resolved: 2024-01-16

## 2024-01-16 PROBLEM — R81 GLYCOSURIA: Status: RESOLVED | Noted: 2017-08-29 | Resolved: 2024-01-16

## 2024-01-16 PROBLEM — R35.89 POLYURIA: Status: RESOLVED | Noted: 2017-08-29 | Resolved: 2024-01-16

## 2024-01-16 PROBLEM — R35.0 URINARY FREQUENCY: Status: RESOLVED | Noted: 2017-08-29 | Resolved: 2024-01-16

## 2024-01-16 PROBLEM — R63.4 WEIGHT LOSS: Status: RESOLVED | Noted: 2017-08-29 | Resolved: 2024-01-16

## 2024-01-16 PROBLEM — R21 RASH: Status: RESOLVED | Noted: 2022-11-14 | Resolved: 2024-01-16

## 2024-01-16 PROCEDURE — 3079F DIAST BP 80-89 MM HG: CPT | Performed by: STUDENT IN AN ORGANIZED HEALTH CARE EDUCATION/TRAINING PROGRAM

## 2024-01-16 PROCEDURE — 90662 IIV NO PRSV INCREASED AG IM: CPT | Performed by: STUDENT IN AN ORGANIZED HEALTH CARE EDUCATION/TRAINING PROGRAM

## 2024-01-16 PROCEDURE — 90471 IMMUNIZATION ADMIN: CPT | Performed by: STUDENT IN AN ORGANIZED HEALTH CARE EDUCATION/TRAINING PROGRAM

## 2024-01-16 PROCEDURE — 3077F SYST BP >= 140 MM HG: CPT | Performed by: STUDENT IN AN ORGANIZED HEALTH CARE EDUCATION/TRAINING PROGRAM

## 2024-01-16 PROCEDURE — 99214 OFFICE O/P EST MOD 30 MIN: CPT | Mod: 25 | Performed by: STUDENT IN AN ORGANIZED HEALTH CARE EDUCATION/TRAINING PROGRAM

## 2024-01-16 RX ORDER — DULAGLUTIDE 3 MG/.5ML
0.5 INJECTION, SOLUTION SUBCUTANEOUS
Qty: 6 ML | Refills: 3 | Status: SHIPPED | OUTPATIENT
Start: 2024-01-16

## 2024-01-16 ASSESSMENT — ENCOUNTER SYMPTOMS
CHILLS: 0
HEADACHES: 0
WHEEZING: 0
VOMITING: 0
DIZZINESS: 0
PALPITATIONS: 0
WEIGHT LOSS: 0
FEVER: 0
SHORTNESS OF BREATH: 0
NAUSEA: 0

## 2024-01-16 ASSESSMENT — PATIENT HEALTH QUESTIONNAIRE - PHQ9: CLINICAL INTERPRETATION OF PHQ2 SCORE: 0

## 2024-01-16 ASSESSMENT — FIBROSIS 4 INDEX: FIB4 SCORE: 2.85

## 2024-01-16 NOTE — PROGRESS NOTES
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      Macrocytosis Without Anemia (Resolved)   Acute Pain of Left Shoulder (Resolved)   Rash (Resolved)   Weight Loss (Resolved)   Urinary Frequency (Resolved)   Glucosuria (Resolved)    IMO load March 2020     Polyuria (Resolved)   Need for Immunization Against Influenza (Resolved)       Health Maintenance:     ROS:  Review of Systems   Constitutional:  Negative for chills, fever and weight loss.   HENT:  Negative for hearing loss.    Respiratory:  Negative for shortness of breath and wheezing.    Cardiovascular:  Negative for chest pain and palpitations.   Gastrointestinal:  Negative for nausea and vomiting.   Genitourinary:  Negative for frequency and urgency.   Skin:  Negative for rash.   Neurological:  Negative for dizziness and headaches.       Objective:     Exam:  BP (!) 156/86 (BP Location: Left arm, Patient Position: Sitting)   Pulse 80   Temp 36.2 °C (97.1 °F) (Temporal)   Ht 1.664 m (5' 5.5\")   Wt 70.8 kg (156 lb)   SpO2 96%   BMI 25.56 kg/m²  Body mass index is " 25.56 kg/m².    Physical Exam  Constitutional:       Appearance: Normal appearance.   Cardiovascular:      Rate and Rhythm: Normal rate and regular rhythm.   Pulmonary:      Effort: Pulmonary effort is normal.      Breath sounds: Normal breath sounds.   Musculoskeletal:      Cervical back: Normal range of motion and neck supple.   Lymphadenopathy:      Cervical: No cervical adenopathy.   Skin:     Comments: No jaundice, spider telangiectasia, caput medusae/ stigmata of liver cirrhosis noted on pe   Neurological:      Mental Status: He is alert.           Labs:     Assessment & Plan:     68 y.o. male with the following -     1. Type 2 diabetes mellitus with microalbuminuria, without long-term current use of insulin (HCC)  Chronic, stable  Interval worsening report due to holidays  Will increase trulicity dose to 3mg weekly  Discussed to stop glipizide as per prior pcp recommendation  - Diabetic Monofilament LE Exam  - CBC WITHOUT DIFFERENTIAL; Future    2. Microscopic hematuria  Documented in 2017, no follow up  - URINALYSIS; Future      4. Dyslipidemia  Chronic stable on atorvastatin 20mg LDL < 100    5. Liver disease, chronic, with cirrhosis (HCC)  Chronic, stable  In setting of alcohol use  Plan  Annual imaging and afp  Will check vaccine status   - AFP SERUM TUMOR MARKER; Future  - CBC WITHOUT DIFFERENTIAL; Future  - Comp Metabolic Panel; Future  - Referral to Gastroenterology  - HEP B SURFACE AB; Future  - HEP B SURFACE ANTIGEN; Future  - HEP B CORE AB TOTAL; Future  - HEP C VIRUS ANTIBODY; Future  - HEP A AB TOTAL; Future  - CBC WITHOUT DIFFERENTIAL; Future    6. Positive colorectal cancer screening using Cologuard test  3/2023, loss to follow up     - Referral to Gastroenterology    7. Encounter for screening colonoscopy    - Referral to Gastroenterology    8. Need for hepatitis B screening test    - HEP B SURFACE AB; Future  - HEP B SURFACE ANTIGEN; Future  - HEP B CORE AB TOTAL; Future    9. Encounter for  hepatitis C screening test for low risk patient    - HEP C VIRUS ANTIBODY; Future    10. Need for vaccination    - INFLUENZA VACCINE, HIGH DOSE (65+ ONLY)        Return in about 3 months (around 4/16/2024) for Diabetes+A1c, Lab review.    Please note that this dictation was created using voice recognition software. I have made every reasonable attempt to correct obvious errors, but I expect that there are errors of grammar and possibly content that I did not discover before finalizing the note.

## 2024-04-26 DIAGNOSIS — R80.9 TYPE 2 DIABETES MELLITUS WITH MICROALBUMINURIA, WITHOUT LONG-TERM CURRENT USE OF INSULIN (HCC): ICD-10-CM

## 2024-04-26 DIAGNOSIS — E11.29 TYPE 2 DIABETES MELLITUS WITH MICROALBUMINURIA, WITHOUT LONG-TERM CURRENT USE OF INSULIN (HCC): ICD-10-CM

## 2024-04-26 RX ORDER — EMPAGLIFLOZIN 25 MG/1
1 TABLET, FILM COATED ORAL DAILY
Qty: 90 TABLET | Refills: 0 | Status: SHIPPED | OUTPATIENT
Start: 2024-04-26

## 2024-04-26 NOTE — TELEPHONE ENCOUNTER
Received request via: Patient    Was the patient seen in the last year in this department? Yes    Does the patient have an active prescription (recently filled or refills available) for medication(s) requested? No    Ph[facility]    Does the patient have FDC Plus and need 100 day supply (blood pressure, diabetes and cholesterol meds only)? Patient does not have SCP

## 2024-07-03 ENCOUNTER — HOSPITAL ENCOUNTER (EMERGENCY)
Facility: MEDICAL CENTER | Age: 69
End: 2024-07-04
Attending: STUDENT IN AN ORGANIZED HEALTH CARE EDUCATION/TRAINING PROGRAM
Payer: COMMERCIAL

## 2024-07-03 ENCOUNTER — APPOINTMENT (OUTPATIENT)
Dept: RADIOLOGY | Facility: MEDICAL CENTER | Age: 69
End: 2024-07-03
Attending: STUDENT IN AN ORGANIZED HEALTH CARE EDUCATION/TRAINING PROGRAM
Payer: COMMERCIAL

## 2024-07-03 DIAGNOSIS — R00.2 PALPITATIONS: ICD-10-CM

## 2024-07-03 DIAGNOSIS — R73.9 HYPERGLYCEMIA: ICD-10-CM

## 2024-07-03 DIAGNOSIS — I10 PRIMARY HYPERTENSION: ICD-10-CM

## 2024-07-03 DIAGNOSIS — E87.6 HYPOKALEMIA: ICD-10-CM

## 2024-07-03 LAB
ALBUMIN SERPL BCP-MCNC: 3.5 G/DL (ref 3.2–4.9)
ALBUMIN/GLOB SERPL: 0.9 G/DL
ALP SERPL-CCNC: 177 U/L (ref 30–99)
ALT SERPL-CCNC: 71 U/L (ref 2–50)
ANION GAP SERPL CALC-SCNC: 15 MMOL/L (ref 7–16)
AST SERPL-CCNC: 46 U/L (ref 12–45)
BASOPHILS # BLD AUTO: 0.4 % (ref 0–1.8)
BASOPHILS # BLD: 0.04 K/UL (ref 0–0.12)
BILIRUB SERPL-MCNC: 1.5 MG/DL (ref 0.1–1.5)
BUN SERPL-MCNC: 10 MG/DL (ref 8–22)
CALCIUM ALBUM COR SERPL-MCNC: 9.2 MG/DL (ref 8.5–10.5)
CALCIUM SERPL-MCNC: 8.8 MG/DL (ref 8.5–10.5)
CHLORIDE SERPL-SCNC: 100 MMOL/L (ref 96–112)
CO2 SERPL-SCNC: 18 MMOL/L (ref 20–33)
CREAT SERPL-MCNC: 0.91 MG/DL (ref 0.5–1.4)
EKG IMPRESSION: NORMAL
EOSINOPHIL # BLD AUTO: 0.05 K/UL (ref 0–0.51)
EOSINOPHIL NFR BLD: 0.5 % (ref 0–6.9)
ERYTHROCYTE [DISTWIDTH] IN BLOOD BY AUTOMATED COUNT: 43.8 FL (ref 35.9–50)
GFR SERPLBLD CREATININE-BSD FMLA CKD-EPI: 90 ML/MIN/1.73 M 2
GLOBULIN SER CALC-MCNC: 4.1 G/DL (ref 1.9–3.5)
GLUCOSE SERPL-MCNC: 375 MG/DL (ref 65–99)
HCT VFR BLD AUTO: 43.3 % (ref 42–52)
HGB BLD-MCNC: 16 G/DL (ref 14–18)
IMM GRANULOCYTES # BLD AUTO: 0.04 K/UL (ref 0–0.11)
IMM GRANULOCYTES NFR BLD AUTO: 0.4 % (ref 0–0.9)
LYMPHOCYTES # BLD AUTO: 1.13 K/UL (ref 1–4.8)
LYMPHOCYTES NFR BLD: 12.3 % (ref 22–41)
MAGNESIUM SERPL-MCNC: 1.8 MG/DL (ref 1.5–2.5)
MCH RBC QN AUTO: 34.2 PG (ref 27–33)
MCHC RBC AUTO-ENTMCNC: 37 G/DL (ref 32.3–36.5)
MCV RBC AUTO: 92.5 FL (ref 81.4–97.8)
MONOCYTES # BLD AUTO: 0.72 K/UL (ref 0–0.85)
MONOCYTES NFR BLD AUTO: 7.8 % (ref 0–13.4)
NEUTROPHILS # BLD AUTO: 7.22 K/UL (ref 1.82–7.42)
NEUTROPHILS NFR BLD: 78.6 % (ref 44–72)
NRBC # BLD AUTO: 0 K/UL
NRBC BLD-RTO: 0 /100 WBC (ref 0–0.2)
PLATELET # BLD AUTO: 134 K/UL (ref 164–446)
PMV BLD AUTO: 10.6 FL (ref 9–12.9)
POTASSIUM SERPL-SCNC: 3.5 MMOL/L (ref 3.6–5.5)
PROT SERPL-MCNC: 7.6 G/DL (ref 6–8.2)
RBC # BLD AUTO: 4.68 M/UL (ref 4.7–6.1)
SODIUM SERPL-SCNC: 133 MMOL/L (ref 135–145)
TROPONIN T SERPL-MCNC: 10 NG/L (ref 6–19)
TSH SERPL DL<=0.005 MIU/L-ACNC: 1.16 UIU/ML (ref 0.38–5.33)
WBC # BLD AUTO: 9.2 K/UL (ref 4.8–10.8)

## 2024-07-03 PROCEDURE — 84443 ASSAY THYROID STIM HORMONE: CPT

## 2024-07-03 PROCEDURE — 85025 COMPLETE CBC W/AUTO DIFF WBC: CPT

## 2024-07-03 PROCEDURE — 80053 COMPREHEN METABOLIC PANEL: CPT

## 2024-07-03 PROCEDURE — 700105 HCHG RX REV CODE 258: Performed by: STUDENT IN AN ORGANIZED HEALTH CARE EDUCATION/TRAINING PROGRAM

## 2024-07-03 PROCEDURE — 99285 EMERGENCY DEPT VISIT HI MDM: CPT

## 2024-07-03 PROCEDURE — 71045 X-RAY EXAM CHEST 1 VIEW: CPT

## 2024-07-03 PROCEDURE — 700102 HCHG RX REV CODE 250 W/ 637 OVERRIDE(OP): Performed by: STUDENT IN AN ORGANIZED HEALTH CARE EDUCATION/TRAINING PROGRAM

## 2024-07-03 PROCEDURE — 93005 ELECTROCARDIOGRAM TRACING: CPT | Performed by: STUDENT IN AN ORGANIZED HEALTH CARE EDUCATION/TRAINING PROGRAM

## 2024-07-03 PROCEDURE — RXMED WILLOW AMBULATORY MEDICATION CHARGE: Performed by: STUDENT IN AN ORGANIZED HEALTH CARE EDUCATION/TRAINING PROGRAM

## 2024-07-03 PROCEDURE — 84484 ASSAY OF TROPONIN QUANT: CPT

## 2024-07-03 PROCEDURE — 83735 ASSAY OF MAGNESIUM: CPT

## 2024-07-03 PROCEDURE — A9270 NON-COVERED ITEM OR SERVICE: HCPCS | Performed by: STUDENT IN AN ORGANIZED HEALTH CARE EDUCATION/TRAINING PROGRAM

## 2024-07-03 RX ORDER — SODIUM CHLORIDE 9 MG/ML
1000 INJECTION, SOLUTION INTRAVENOUS ONCE
Status: COMPLETED | OUTPATIENT
Start: 2024-07-04 | End: 2024-07-04

## 2024-07-03 RX ORDER — LISINOPRIL 5 MG/1
5 TABLET ORAL DAILY
Qty: 30 TABLET | Refills: 0 | Status: SHIPPED | OUTPATIENT
Start: 2024-07-03

## 2024-07-03 RX ORDER — POTASSIUM CHLORIDE 20 MEQ/1
20 TABLET, EXTENDED RELEASE ORAL ONCE
Status: COMPLETED | OUTPATIENT
Start: 2024-07-03 | End: 2024-07-03

## 2024-07-03 RX ADMIN — POTASSIUM CHLORIDE 20 MEQ: 1500 TABLET, EXTENDED RELEASE ORAL at 23:58

## 2024-07-03 RX ADMIN — SODIUM CHLORIDE 1000 ML: 9 INJECTION, SOLUTION INTRAVENOUS at 23:38

## 2024-07-03 ASSESSMENT — FIBROSIS 4 INDEX: FIB4 SCORE: 2.85

## 2024-07-04 ENCOUNTER — PHARMACY VISIT (OUTPATIENT)
Dept: PHARMACY | Facility: MEDICAL CENTER | Age: 69
End: 2024-07-04
Payer: COMMERCIAL

## 2024-07-04 VITALS
TEMPERATURE: 98.2 F | SYSTOLIC BLOOD PRESSURE: 165 MMHG | WEIGHT: 140 LBS | OXYGEN SATURATION: 93 % | DIASTOLIC BLOOD PRESSURE: 108 MMHG | HEIGHT: 65 IN | RESPIRATION RATE: 19 BRPM | HEART RATE: 101 BPM | BODY MASS INDEX: 23.32 KG/M2

## 2024-07-25 DIAGNOSIS — R80.9 TYPE 2 DIABETES MELLITUS WITH MICROALBUMINURIA, WITHOUT LONG-TERM CURRENT USE OF INSULIN (HCC): ICD-10-CM

## 2024-07-25 DIAGNOSIS — E11.29 TYPE 2 DIABETES MELLITUS WITH MICROALBUMINURIA, WITHOUT LONG-TERM CURRENT USE OF INSULIN (HCC): ICD-10-CM

## 2024-07-25 RX ORDER — EMPAGLIFLOZIN 25 MG/1
1 TABLET, FILM COATED ORAL DAILY
Qty: 90 TABLET | Refills: 0 | Status: SHIPPED | OUTPATIENT
Start: 2024-07-25